# Patient Record
Sex: FEMALE | Race: WHITE | Employment: UNEMPLOYED | URBAN - METROPOLITAN AREA
[De-identification: names, ages, dates, MRNs, and addresses within clinical notes are randomized per-mention and may not be internally consistent; named-entity substitution may affect disease eponyms.]

---

## 2023-04-18 ENCOUNTER — INITIAL PRENATAL (OUTPATIENT)
Dept: OBGYN CLINIC | Age: 33
End: 2023-04-18
Payer: COMMERCIAL

## 2023-04-18 ENCOUNTER — ANCILLARY PROCEDURE (OUTPATIENT)
Dept: OBGYN CLINIC | Age: 33
End: 2023-04-18
Payer: COMMERCIAL

## 2023-04-18 DIAGNOSIS — Z34.90 PREGNANCY, UNSPECIFIED GESTATIONAL AGE: Primary | ICD-10-CM

## 2023-04-18 PROCEDURE — 76817 TRANSVAGINAL US OBSTETRIC: CPT | Performed by: OBSTETRICS & GYNECOLOGY

## 2023-04-18 PROCEDURE — 99203 OFFICE O/P NEW LOW 30 MIN: CPT

## 2023-04-18 NOTE — PROGRESS NOTES
MFM Note    The patient presented today for an ultrasound only for viability. A small subchorionic hemorrhage was seen. The patient is noted to be Rh positive. Pregnancy dating updated in [address] Please see the ultrasound report for details.

## 2023-05-08 ENCOUNTER — INITIAL PRENATAL (OUTPATIENT)
Dept: OBGYN | Age: 33
End: 2023-05-08
Payer: COMMERCIAL

## 2023-05-08 ENCOUNTER — HOSPITAL ENCOUNTER (OUTPATIENT)
Age: 33
Discharge: HOME | End: 2023-05-08
Payer: COMMERCIAL

## 2023-05-08 VITALS
DIASTOLIC BLOOD PRESSURE: 71 MMHG | HEIGHT: 64 IN | HEART RATE: 67 BPM | WEIGHT: 154.4 LBS | SYSTOLIC BLOOD PRESSURE: 110 MMHG | BODY MASS INDEX: 26.36 KG/M2

## 2023-05-08 DIAGNOSIS — Z12.4 CERVICAL CANCER SCREENING: ICD-10-CM

## 2023-05-08 DIAGNOSIS — Z34.91 ENCOUNTER FOR SUPERVISION OF NORMAL PREGNANCY IN FIRST TRIMESTER, UNSPECIFIED GRAVIDITY: Primary | ICD-10-CM

## 2023-05-08 DIAGNOSIS — Z34.91 ENCOUNTER FOR SUPERVISION OF NORMAL PREGNANCY IN FIRST TRIMESTER, UNSPECIFIED GRAVIDITY: ICD-10-CM

## 2023-05-08 DIAGNOSIS — N91.2 AMENORRHEA: ICD-10-CM

## 2023-05-08 DIAGNOSIS — Z98.890 H/O LEEP: ICD-10-CM

## 2023-05-08 PROBLEM — Z84.81 FAMILY HISTORY OF BREAST CANCER GENE MUTATION IN FIRST DEGREE RELATIVE: Status: ACTIVE | Noted: 2020-01-22

## 2023-05-08 LAB
ABO + RH BLD: NORMAL
AMPHET UR QL SCN: NOT DETECTED
BARBITURATES UR QL SCN: NOT DETECTED
BENZODIAZ UR QL SCN: NOT DETECTED
BLD GP AB SCN SERPL QL: NORMAL
CANNABINOIDS UR QL SCN: NOT DETECTED
COCAINE UR QL SCN: NOT DETECTED
DRUG SCREEN COMMENT UR-IMP: NORMAL
ERYTHROCYTE [DISTWIDTH] IN BLOOD BY AUTOMATED COUNT: 12.7 FL (ref 11.5–15)
FENTANYL SCREEN, URINE: NOT DETECTED
GLUCOSE URINE, POC: NEGATIVE
HCT VFR BLD AUTO: 39.5 % (ref 34–48)
HGB BLD-MCNC: 13 G/DL (ref 11.5–15.5)
MCH RBC QN AUTO: 29.1 PG (ref 26–35)
MCHC RBC AUTO-ENTMCNC: 32.9 % (ref 32–34.5)
MCV RBC AUTO: 88.4 FL (ref 80–99.9)
METHADONE UR QL SCN: NOT DETECTED
OPIATES UR QL SCN: NOT DETECTED
OXYCODONE URINE: NOT DETECTED
PCP UR QL SCN: NOT DETECTED
PLATELET # BLD AUTO: 252 E9/L (ref 130–450)
PMV BLD AUTO: 10.4 FL (ref 7–12)
PROTEIN UA: NEGATIVE
RBC # BLD AUTO: 4.47 E12/L (ref 3.5–5.5)
WBC # BLD: 7.9 E9/L (ref 4.5–11.5)

## 2023-05-08 PROCEDURE — 86850 RBC ANTIBODY SCREEN: CPT

## 2023-05-08 PROCEDURE — 81002 URINALYSIS NONAUTO W/O SCOPE: CPT | Performed by: MIDWIFE

## 2023-05-08 PROCEDURE — 86901 BLOOD TYPING SEROLOGIC RH(D): CPT

## 2023-05-08 PROCEDURE — 86762 RUBELLA ANTIBODY: CPT

## 2023-05-08 PROCEDURE — 80307 DRUG TEST PRSMV CHEM ANLYZR: CPT

## 2023-05-08 PROCEDURE — 83020 HEMOGLOBIN ELECTROPHORESIS: CPT

## 2023-05-08 PROCEDURE — 86703 HIV-1/HIV-2 1 RESULT ANTBDY: CPT

## 2023-05-08 PROCEDURE — 83021 HEMOGLOBIN CHROMOTOGRAPHY: CPT

## 2023-05-08 PROCEDURE — 86787 VARICELLA-ZOSTER ANTIBODY: CPT

## 2023-05-08 PROCEDURE — 85027 COMPLETE CBC AUTOMATED: CPT

## 2023-05-08 PROCEDURE — 86900 BLOOD TYPING SEROLOGIC ABO: CPT

## 2023-05-08 PROCEDURE — 86803 HEPATITIS C AB TEST: CPT

## 2023-05-08 PROCEDURE — 0502F SUBSEQUENT PRENATAL CARE: CPT | Performed by: MIDWIFE

## 2023-05-08 PROCEDURE — 87340 HEPATITIS B SURFACE AG IA: CPT

## 2023-05-08 PROCEDURE — 36415 COLL VENOUS BLD VENIPUNCTURE: CPT

## 2023-05-08 PROCEDURE — 86592 SYPHILIS TEST NON-TREP QUAL: CPT

## 2023-05-08 PROCEDURE — 87088 URINE BACTERIA CULTURE: CPT

## 2023-05-08 PROCEDURE — 99213 OFFICE O/P EST LOW 20 MIN: CPT

## 2023-05-08 RX ORDER — PSEUDOEPHEDRINE HCL 30 MG
200 TABLET ORAL NIGHTLY
COMMUNITY
Start: 2022-04-07 | End: 2023-05-08

## 2023-05-08 RX ORDER — ACETAMINOPHEN 325 MG/1
650 TABLET ORAL EVERY 6 HOURS PRN
COMMUNITY
Start: 2022-04-07

## 2023-05-08 SDOH — ECONOMIC STABILITY: HOUSING INSECURITY
IN THE LAST 12 MONTHS, WAS THERE A TIME WHEN YOU DID NOT HAVE A STEADY PLACE TO SLEEP OR SLEPT IN A SHELTER (INCLUDING NOW)?: NO

## 2023-05-08 SDOH — ECONOMIC STABILITY: INCOME INSECURITY: HOW HARD IS IT FOR YOU TO PAY FOR THE VERY BASICS LIKE FOOD, HOUSING, MEDICAL CARE, AND HEATING?: NOT HARD AT ALL

## 2023-05-08 SDOH — ECONOMIC STABILITY: FOOD INSECURITY: WITHIN THE PAST 12 MONTHS, YOU WORRIED THAT YOUR FOOD WOULD RUN OUT BEFORE YOU GOT MONEY TO BUY MORE.: NEVER TRUE

## 2023-05-08 SDOH — ECONOMIC STABILITY: FOOD INSECURITY: WITHIN THE PAST 12 MONTHS, THE FOOD YOU BOUGHT JUST DIDN'T LAST AND YOU DIDN'T HAVE MONEY TO GET MORE.: NEVER TRUE

## 2023-05-08 ASSESSMENT — ENCOUNTER SYMPTOMS
RESPIRATORY NEGATIVE: 1
ALLERGIC/IMMUNOLOGIC NEGATIVE: 1
EYES NEGATIVE: 1
GASTROINTESTINAL NEGATIVE: 1

## 2023-05-08 NOTE — PROGRESS NOTES
New patient alert and pleasant with no complaints. Previously seen at [facility] a few weeks ago. Was a former patient of Dr. Alexa Engel. Here today for initial prenatal visit. Urine for glucose and protein obtained with negative results. Pap smear and urines obtained, labeled and sent to the lab. Directed to the lab to obtain additional ordered blood work. Discharge instructions have been discussed with the patient. Patient advised to call our office with any questions or concerns. Voiced understanding.
Specific Question:   Specify (ex-cath, midstream, cysto, etc)? Answer:   midstream    PAP SMEAR     Patient History:    Patient's last menstrual period was 02/20/2023 (exact date). OBGYN Status: Pregnant  Past Surgical History:  No date: LEEP  1997: TONSILLECTOMY      Social History    Tobacco Use      Smoking status: Never      Smokeless tobacco: Never       Order Specific Question:   Collection Type     Answer:   Imaged Thin Prep     Order Specific Question:   Prior Abnormal Pap Test     Answer:   No     Order Specific Question:   Screening or Diagnostic     Answer:   Screening     Order Specific Question:   Additional STD Testing     Answer:   GC and Chlamydia     Order Specific Question:   Diagnosis Code for Additional STD Testing     Answer:   Screen for STD (sexually transmitted disease) (Z11.3)     Order Specific Question:   HPV Requested? Answer:   HPV Co-Test     Order Specific Question:   High Risk Patient     Answer:   N/A    CBC     Standing Status:   Future     Standing Expiration Date:   5/8/2024    Hepatitis B Surface Antigen     Standing Status:   Future     Standing Expiration Date:   5/8/2024    HIV Screen     Standing Status:   Future     Standing Expiration Date:   5/8/2024    RPR     Standing Status:   Future     Standing Expiration Date:   5/8/2024    Rubella     Standing Status:   Future     Standing Expiration Date:   5/8/2024    Miscellaneous Sendout     Standing Status:   Future     Standing Expiration Date:   5/8/2024     Order Specific Question:   Specify Req.  Test (1 Test/Order)     Answer:   hemoglobin electrophoresis    Varicella Zoster Antibody, IgG     Standing Status:   Future     Standing Expiration Date:   5/8/2024    Hepatitis C Antibody     Standing Status:   Future     Standing Expiration Date:   5/8/2024    URINE DRUG SCREEN     Standing Status:   Future     Standing Expiration Date:   5/8/2024    POCT urine qual dipstick glucose    POCT urine qual dipstick protein

## 2023-05-08 NOTE — PATIENT INSTRUCTIONS
OB Teaching    Call coverage/emergencies:  Patient was informed of call coverage including 24 hour coverage by house physician and rotating call from nurse midwives. Patient can call the clinic number for instructions on how to speak with the provider on call. Patient told to proceed to ER/L&D if heavy vaginal bleeding, LOF or other concerning sx. Course of pregnancy:  Patient informed that [id number] visits are monthly until 28 weeks, bi-weekly from 28-36 weeks, and weekly thereafter. Patient informed that labs are planned at new OB visit, at 25 -28 weeks gestation, and GBS at 36 weeks gestation; ultrasound planned in first trimester and anatomy scan at 20 weeks with M. Additional labs/imaging may be done if necessary, including genetic screening in first and second trimester. Discussed genetic screening. Nutrition:  Patient instructed to inform provider if vomiting and unable to keep anything down for 12 hours. Nausea can be treated with small frequent meals, preventing dehydration, Vitamin B6 and Unisom. These are very inexpensive, and are safe to take during pregnancy. Take them at night as the Unisom will make you tired. If these aren't helpful after 3-4 days of taking them every night, please let us know. Prescriptions can be added if these measures not helpful. Patient is to take prenatal vitamins and ensure that she has [id number] of folic acid daily throughout pregnancy and continue throughout childbearing years. Starting in second trimester, patient should have minimum of 70 grams of protein per day (4 servings). Avoid soft cheeses, meats that are not cooked through, lunch meat, and certain fish including shark, tilefish, robert [facility] and swordfish. Tylene Sully should be limited to once per week. Lifting:  patient should not lift more than 15 lbs using good body mechanics. Work note can be provided    Dentist:  Patient should continue routine dental care during pregnancy.   Should an issue

## 2023-05-09 LAB
AMPHET UR QL SCN: NOT DETECTED
BARBITURATES UR QL SCN: NOT DETECTED
BENZODIAZ UR QL SCN: NOT DETECTED
CANNABINOIDS UR QL SCN: NOT DETECTED
COCAINE UR QL SCN: NOT DETECTED
DRUG SCREEN COMMENT UR-IMP: NORMAL
FENTANYL SCREEN, URINE: NOT DETECTED
HBV SURFACE AG SERPL QL IA: NORMAL
HCV AB SERPL QL IA: NORMAL
HIV1+2 AB SERPL QL IA: NORMAL
METHADONE UR QL SCN: NOT DETECTED
OPIATES UR QL SCN: NOT DETECTED
OXYCODONE URINE: NOT DETECTED
PCP UR QL SCN: NOT DETECTED
RPR SER QL: NORMAL
RUBELLA ANTIBODY IGG: NORMAL
VARICELLA-ZOSTER VIRUS AB, IGG: NORMAL

## 2023-05-10 LAB — BACTERIA UR CULT: NORMAL

## 2023-05-11 LAB
BACTERIA UR CULT: NORMAL
C TRACH DNA SPEC QL NAA+PROBE: NEGATIVE
HPV HR 12 DNA SPEC QL NAA+PROBE: NOT DETECTED
HPV SAMPLE: NORMAL
HPV16 DNA SPEC QL NAA+PROBE: NOT DETECTED
HPV16+18+H RISK 12 DNA CVX-IMP: NORMAL
HPV18 DNA SPEC QL NAA+PROBE: NOT DETECTED
N GONORRHOEA DNA SPEC QL NAA+PROBE: NEGATIVE
SPECIMEN SOURCE: NORMAL
SPECIMEN SOURCE: NORMAL

## 2023-05-12 LAB
Lab: NORMAL
REPORT: NORMAL
THIS TEST SENT TO: NORMAL

## 2023-06-05 ENCOUNTER — ROUTINE PRENATAL (OUTPATIENT)
Dept: OBGYN | Age: 33
End: 2023-06-05
Payer: COMMERCIAL

## 2023-06-05 VITALS
HEART RATE: 66 BPM | WEIGHT: 158.1 LBS | SYSTOLIC BLOOD PRESSURE: 108 MMHG | DIASTOLIC BLOOD PRESSURE: 70 MMHG | BODY MASS INDEX: 27.14 KG/M2

## 2023-06-05 DIAGNOSIS — Z34.91 ENCOUNTER FOR SUPERVISION OF NORMAL PREGNANCY IN FIRST TRIMESTER, UNSPECIFIED GRAVIDITY: Primary | ICD-10-CM

## 2023-06-05 LAB
GLUCOSE URINE, POC: NEGATIVE
PROTEIN UA: NEGATIVE

## 2023-06-05 PROCEDURE — 99213 OFFICE O/P EST LOW 20 MIN: CPT | Performed by: MIDWIFE

## 2023-06-05 PROCEDURE — 81002 URINALYSIS NONAUTO W/O SCOPE: CPT | Performed by: MIDWIFE

## 2023-06-05 PROCEDURE — G8427 DOCREV CUR MEDS BY ELIG CLIN: HCPCS | Performed by: MIDWIFE

## 2023-06-05 PROCEDURE — 1036F TOBACCO NON-USER: CPT | Performed by: MIDWIFE

## 2023-06-05 PROCEDURE — G8419 CALC BMI OUT NRM PARAM NOF/U: HCPCS | Performed by: MIDWIFE

## 2023-07-10 ENCOUNTER — INITIAL PRENATAL (OUTPATIENT)
Dept: OBGYN CLINIC | Age: 33
End: 2023-07-10
Payer: COMMERCIAL

## 2023-07-10 ENCOUNTER — ROUTINE PRENATAL (OUTPATIENT)
Dept: OBGYN | Age: 33
End: 2023-07-10
Payer: COMMERCIAL

## 2023-07-10 ENCOUNTER — ANCILLARY PROCEDURE (OUTPATIENT)
Dept: OBGYN CLINIC | Age: 33
End: 2023-07-10
Payer: COMMERCIAL

## 2023-07-10 VITALS
SYSTOLIC BLOOD PRESSURE: 102 MMHG | HEIGHT: 64 IN | HEART RATE: 68 BPM | DIASTOLIC BLOOD PRESSURE: 62 MMHG | BODY MASS INDEX: 28.51 KG/M2 | WEIGHT: 167 LBS

## 2023-07-10 VITALS
DIASTOLIC BLOOD PRESSURE: 63 MMHG | BODY MASS INDEX: 28.9 KG/M2 | HEART RATE: 91 BPM | SYSTOLIC BLOOD PRESSURE: 100 MMHG | WEIGHT: 168.38 LBS

## 2023-07-10 DIAGNOSIS — O43.192 MARGINAL INSERTION OF UMBILICAL CORD AFFECTING MANAGEMENT OF MOTHER IN SECOND TRIMESTER: ICD-10-CM

## 2023-07-10 DIAGNOSIS — Z71.83 ENCOUNTER FOR NONPROCREATIVE GENETIC COUNSELING: ICD-10-CM

## 2023-07-10 DIAGNOSIS — Z98.890 H/O LEEP: ICD-10-CM

## 2023-07-10 DIAGNOSIS — Z3A.20 20 WEEKS GESTATION OF PREGNANCY: Primary | ICD-10-CM

## 2023-07-10 DIAGNOSIS — Z98.890 H/O LEEP: Primary | ICD-10-CM

## 2023-07-10 DIAGNOSIS — Z80.3 FAMILY HISTORY OF BREAST CANCER: ICD-10-CM

## 2023-07-10 LAB
GLUCOSE URINE, POC: NEGATIVE
PROTEIN UA: NEGATIVE

## 2023-07-10 PROCEDURE — G8427 DOCREV CUR MEDS BY ELIG CLIN: HCPCS | Performed by: OBSTETRICS & GYNECOLOGY

## 2023-07-10 PROCEDURE — 0502F SUBSEQUENT PRENATAL CARE: CPT | Performed by: MIDWIFE

## 2023-07-10 PROCEDURE — 99203 OFFICE O/P NEW LOW 30 MIN: CPT | Performed by: OBSTETRICS & GYNECOLOGY

## 2023-07-10 PROCEDURE — 81002 URINALYSIS NONAUTO W/O SCOPE: CPT | Performed by: MIDWIFE

## 2023-07-10 PROCEDURE — G8419 CALC BMI OUT NRM PARAM NOF/U: HCPCS | Performed by: OBSTETRICS & GYNECOLOGY

## 2023-07-10 PROCEDURE — 76811 OB US DETAILED SNGL FETUS: CPT | Performed by: OBSTETRICS & GYNECOLOGY

## 2023-07-10 PROCEDURE — 99213 OFFICE O/P EST LOW 20 MIN: CPT | Performed by: MIDWIFE

## 2023-07-10 PROCEDURE — 1036F TOBACCO NON-USER: CPT | Performed by: MIDWIFE

## 2023-07-10 PROCEDURE — 76817 TRANSVAGINAL US OBSTETRIC: CPT | Performed by: OBSTETRICS & GYNECOLOGY

## 2023-07-10 NOTE — PROGRESS NOTES
, return OB at 20w0d weeks    Patient Active Problem List   Diagnosis    Family history of breast cancer gene mutation in first degree relative    H/O LEEP        Subjective:  doing well. She is concerned about her weigh gain, which is 13# thus far. We reviewed the guidelines, she was relieved that she is doing well with that    Nausea no Vomiting no   Bleeding no     Objective:  See flowsheet  Vitals:    07/10/23 1146   BP: 102/62   Pulse: 68     US today per MFM  She has marginal insertion, needs repeat for additional heart views.   Reports was not finalized so I didn't personally review it    Assessment:   at 20w0d   Blood pressure WNL  Size equals dates  Total weight gain appropriate  Not eligible    ICD-10-CM    1. H/O LEEP  Z98.890 POCT urine qual dipstick protein     POCT urine qual dipstick glucose           Plan:    RTO 4 weeks  Orders Placed This Encounter   Procedures    POCT urine qual dipstick protein    POCT urine qual dipstick glucose     , return OB at 20w0d weeks

## 2023-07-10 NOTE — PROGRESS NOTES
Routine ob  +fm  Pt denies lof vag bleeding or contractions  Pt denies any other concerns  Urine dip neg/neg ORIF left humerus

## 2023-07-17 PROBLEM — Z80.3 FAMILY HISTORY OF BREAST CANCER: Status: ACTIVE | Noted: 2023-07-17

## 2023-07-17 PROBLEM — Z71.83 ENCOUNTER FOR NONPROCREATIVE GENETIC COUNSELING: Status: ACTIVE | Noted: 2023-07-17

## 2023-07-17 PROBLEM — O43.192 MARGINAL INSERTION OF UMBILICAL CORD AFFECTING MANAGEMENT OF MOTHER IN SECOND TRIMESTER: Status: ACTIVE | Noted: 2023-07-17

## 2023-07-28 ENCOUNTER — HOSPITAL ENCOUNTER (OUTPATIENT)
Age: 33
Discharge: HOME | End: 2023-07-28
Payer: COMMERCIAL

## 2023-07-28 DIAGNOSIS — Z34.91 ENCOUNTER FOR SUPERVISION OF NORMAL PREGNANCY IN FIRST TRIMESTER, UNSPECIFIED GRAVIDITY: ICD-10-CM

## 2023-07-28 PROCEDURE — 82105 ALPHA-FETOPROTEIN SERUM: CPT

## 2023-07-31 LAB
AFP INTERPRETATION: NORMAL
AFP MOM: 0.85
AFP SPECIMEN: NORMAL
AFP: 71 NG/ML
DATE OF BIRTH: NORMAL
DATING METHOD: NORMAL
DETERMINED BY: NORMAL
DIABETIC: NORMAL
DONOR EGG?: NORMAL
DUE DATE: NORMAL
ESTIMATED DUE DATE: NORMAL
FAMILY HISTORY NTD: NORMAL
GESTATIONAL AGE: NORMAL
IN VITRO FERTILIZATION: NORMAL
INSULIN REQ DIABETES: NO
LAST MENSTRUAL PERIOD: NORMAL
MATERNAL AGE AT EDD: 32.5 YR
MATERNAL WEIGHT: NORMAL
MONOCHORIONIC TWINS: NORMAL
NUMBER OF FETUSES: NORMAL
PATIENT WEIGHT UNITS: NORMAL
PATIENT WEIGHT: NORMAL
RACE (MATERNAL): NORMAL
RACE: NORMAL
REPEAT SPECIMEN?: NORMAL
SMOKING: NO
SMOKING: NORMAL
VALPROIC/CARBAMAZEP: NORMAL
ZZ NTE CLEAN UP: HISTORY: NO

## 2023-08-07 ENCOUNTER — ANCILLARY PROCEDURE (OUTPATIENT)
Dept: OBGYN CLINIC | Age: 33
End: 2023-08-07
Payer: COMMERCIAL

## 2023-08-07 ENCOUNTER — ROUTINE PRENATAL (OUTPATIENT)
Dept: OBGYN | Age: 33
End: 2023-08-07
Payer: COMMERCIAL

## 2023-08-07 ENCOUNTER — ROUTINE PRENATAL (OUTPATIENT)
Dept: OBGYN CLINIC | Age: 33
End: 2023-08-07
Payer: COMMERCIAL

## 2023-08-07 VITALS
SYSTOLIC BLOOD PRESSURE: 106 MMHG | HEART RATE: 69 BPM | BODY MASS INDEX: 29.99 KG/M2 | DIASTOLIC BLOOD PRESSURE: 72 MMHG | WEIGHT: 174.7 LBS

## 2023-08-07 VITALS
DIASTOLIC BLOOD PRESSURE: 71 MMHG | WEIGHT: 175.38 LBS | HEART RATE: 105 BPM | SYSTOLIC BLOOD PRESSURE: 103 MMHG | BODY MASS INDEX: 30.1 KG/M2

## 2023-08-07 DIAGNOSIS — Z98.890 H/O LEEP: Primary | ICD-10-CM

## 2023-08-07 DIAGNOSIS — Z3A.24 24 WEEKS GESTATION OF PREGNANCY: ICD-10-CM

## 2023-08-07 DIAGNOSIS — Z3A.24 PREGNANCY WITH 24 COMPLETED WEEKS GESTATION: ICD-10-CM

## 2023-08-07 DIAGNOSIS — O43.192 MARGINAL INSERTION OF UMBILICAL CORD AFFECTING MANAGEMENT OF MOTHER IN SECOND TRIMESTER: ICD-10-CM

## 2023-08-07 DIAGNOSIS — Z71.83 ENCOUNTER FOR NONPROCREATIVE GENETIC COUNSELING: ICD-10-CM

## 2023-08-07 DIAGNOSIS — Z80.3 FAMILY HISTORY OF BREAST CANCER: ICD-10-CM

## 2023-08-07 LAB
GLUCOSE URINE, POC: NEGATIVE
PROTEIN UA: POSITIVE

## 2023-08-07 PROCEDURE — 81002 URINALYSIS NONAUTO W/O SCOPE: CPT | Performed by: OBSTETRICS & GYNECOLOGY

## 2023-08-07 PROCEDURE — G8427 DOCREV CUR MEDS BY ELIG CLIN: HCPCS | Performed by: MIDWIFE

## 2023-08-07 PROCEDURE — 99213 OFFICE O/P EST LOW 20 MIN: CPT | Performed by: MIDWIFE

## 2023-08-07 PROCEDURE — G8427 DOCREV CUR MEDS BY ELIG CLIN: HCPCS | Performed by: OBSTETRICS & GYNECOLOGY

## 2023-08-07 PROCEDURE — 76817 TRANSVAGINAL US OBSTETRIC: CPT | Performed by: OBSTETRICS & GYNECOLOGY

## 2023-08-07 PROCEDURE — 76816 OB US FOLLOW-UP PER FETUS: CPT | Performed by: OBSTETRICS & GYNECOLOGY

## 2023-08-07 PROCEDURE — 1036F TOBACCO NON-USER: CPT | Performed by: MIDWIFE

## 2023-08-07 PROCEDURE — G8419 CALC BMI OUT NRM PARAM NOF/U: HCPCS | Performed by: MIDWIFE

## 2023-08-07 PROCEDURE — 99213 OFFICE O/P EST LOW 20 MIN: CPT | Performed by: OBSTETRICS & GYNECOLOGY

## 2023-08-07 PROCEDURE — G8419 CALC BMI OUT NRM PARAM NOF/U: HCPCS | Performed by: OBSTETRICS & GYNECOLOGY

## 2023-08-07 PROCEDURE — 99214 OFFICE O/P EST MOD 30 MIN: CPT | Performed by: OBSTETRICS & GYNECOLOGY

## 2023-08-07 PROCEDURE — 1036F TOBACCO NON-USER: CPT | Performed by: OBSTETRICS & GYNECOLOGY

## 2023-08-07 ASSESSMENT — PATIENT HEALTH QUESTIONNAIRE - PHQ9
SUM OF ALL RESPONSES TO PHQ QUESTIONS 1-9: 0
SUM OF ALL RESPONSES TO PHQ9 QUESTIONS 1 & 2: 0
SUM OF ALL RESPONSES TO PHQ QUESTIONS 1-9: 0
1. LITTLE INTEREST OR PLEASURE IN DOING THINGS: 0
2. FEELING DOWN, DEPRESSED OR HOPELESS: 0

## 2023-08-07 NOTE — PROGRESS NOTES
Patient alert and pleasant with no complaints. Here today for prenatal visit. Fetal heart tones and urine already done at [facility] this am.  Directed to the lab to obtain ordered blood work. Discharge instructions have been discussed with the patient. Patient advised to call our office with any questions or concerns. Voiced understanding.

## 2023-08-07 NOTE — PATIENT INSTRUCTIONS
Please arrive for your scheduled appointment at least 15 minutes early with your actual insurance card+ a photo ID. Also if you need any refills ordered or have questions, it may take up 48 hours to reply. Please allow ample time for your refills. Call me when you use last refill. Thank you for your cooperation. Call your primary obstetrician with bleeding, leaking of fluid, abdominal tenderness, headache, blurry vision, epigastric pain and increased urinary frequency. If you are experiencing an emergency and need immediate help, call 911 or go to go emergency room or labor and delivery. if you are sick, not feeling well or have an infectious process going on please reschedule your appointment by calling [phone]. Also if any family members are not feeling well, please do not bring them to your appointment. We appreciate your cooperation. We are doing this in order to protect our pregnant mothers+ their babies. if you are sick, not feeling well or have an infectious process going on please reschedule your appointment by calling [phone]. Also if any family members are not feeling well, please do not bring them to your appointment. We appreciate your cooperation. We are doing this in order to protect our pregnant mothers+ their babies.

## 2023-08-07 NOTE — PROGRESS NOTES
Dr. Felecia Hull adding quad screen to AFP specimen. Patient is OK with that, but she doesn't want Panorama. She had US today at [facility] to recheck marginal insertion. Notes/US not available for review     , return OB at 24w0d weeks    Patient Active Problem List   Diagnosis    Family history of breast cancer gene mutation in first degree relative    H/O LEEP    Encounter for nonprocreative genetic counseling    Marginal insertion of umbilical cord affecting management of mother in second trimester    Family history of breast cancer        Subjective:  doing well    Bleeding no   Leaking of fluid no   Painful cramping/contractions no   Headache no   Epigastric pain no   Edema no     Fetal movement +, she is feeling movement every day now    Objective:  See flowsheet    Vitals:    23 1134   BP: 106/72   Pulse: 69       FH per US  FHT per US    1 hour GTT and CBC ordered today, she will get this done day before next visit  TDAP:  discussed, she is agreeable, will get at next visit    Assessment:   at 24w0d   Blood pressure WNL    Total weight gain appropriate   rhogam:  Not eligible    ICD-10-CM    1. H/O LEEP  Z98.890       2. Pregnancy with 24 completed weeks gestation  Z3A.24 CBC     Type and Screen     RPR     Glucose tolerance, 1 hour           Plan:    RTO 4 weeks    Orders Placed This Encounter   Procedures    CBC     Standing Status:   Future     Standing Expiration Date:   10/7/2023    RPR     Standing Status:   Future     Standing Expiration Date:   10/7/2023    Glucose tolerance, 1 hour     Standing Status:   Future     Standing Expiration Date:   10/7/2023    Type and Screen     Standing Status:   Future     Standing Expiration Date:   2024      Fetal movement:  Baby should move 10 times every 2 hours. If movements decrease below 10 in 2 hours, or decrease from what is typical for that pregnancy, patient should eat something, drink something, and lay down to count movements.

## 2023-09-06 ENCOUNTER — ROUTINE PRENATAL (OUTPATIENT)
Dept: OBGYN CLINIC | Age: 33
End: 2023-09-06
Payer: COMMERCIAL

## 2023-09-06 ENCOUNTER — ANCILLARY PROCEDURE (OUTPATIENT)
Dept: OBGYN CLINIC | Age: 33
End: 2023-09-06
Payer: COMMERCIAL

## 2023-09-06 VITALS
DIASTOLIC BLOOD PRESSURE: 65 MMHG | HEART RATE: 66 BPM | WEIGHT: 180 LBS | BODY MASS INDEX: 30.9 KG/M2 | SYSTOLIC BLOOD PRESSURE: 93 MMHG

## 2023-09-06 DIAGNOSIS — Z98.890 H/O LEEP: Primary | ICD-10-CM

## 2023-09-06 DIAGNOSIS — O43.192 MARGINAL INSERTION OF UMBILICAL CORD AFFECTING MANAGEMENT OF MOTHER IN SECOND TRIMESTER: ICD-10-CM

## 2023-09-06 DIAGNOSIS — Z3A.28 28 WEEKS GESTATION OF PREGNANCY: ICD-10-CM

## 2023-09-06 LAB
GLUCOSE URINE, POC: NEGATIVE
PROTEIN UA: NEGATIVE

## 2023-09-06 PROCEDURE — 76821 MIDDLE CEREBRAL ARTERY ECHO: CPT | Performed by: OBSTETRICS & GYNECOLOGY

## 2023-09-06 PROCEDURE — 99213 OFFICE O/P EST LOW 20 MIN: CPT | Performed by: OBSTETRICS & GYNECOLOGY

## 2023-09-06 PROCEDURE — 81002 URINALYSIS NONAUTO W/O SCOPE: CPT | Performed by: OBSTETRICS & GYNECOLOGY

## 2023-09-06 PROCEDURE — 76820 UMBILICAL ARTERY ECHO: CPT | Performed by: OBSTETRICS & GYNECOLOGY

## 2023-09-06 PROCEDURE — 76817 TRANSVAGINAL US OBSTETRIC: CPT | Performed by: OBSTETRICS & GYNECOLOGY

## 2023-09-06 PROCEDURE — G8427 DOCREV CUR MEDS BY ELIG CLIN: HCPCS | Performed by: OBSTETRICS & GYNECOLOGY

## 2023-09-06 PROCEDURE — 76819 FETAL BIOPHYS PROFIL W/O NST: CPT | Performed by: OBSTETRICS & GYNECOLOGY

## 2023-09-06 PROCEDURE — 76816 OB US FOLLOW-UP PER FETUS: CPT | Performed by: OBSTETRICS & GYNECOLOGY

## 2023-09-06 PROCEDURE — 1036F TOBACCO NON-USER: CPT | Performed by: OBSTETRICS & GYNECOLOGY

## 2023-09-06 PROCEDURE — G8419 CALC BMI OUT NRM PARAM NOF/U: HCPCS | Performed by: OBSTETRICS & GYNECOLOGY

## 2023-09-06 NOTE — PATIENT INSTRUCTIONS

## 2023-09-08 ENCOUNTER — HOSPITAL ENCOUNTER (OUTPATIENT)
Age: 33
Discharge: HOME | End: 2023-09-08
Payer: COMMERCIAL

## 2023-09-08 DIAGNOSIS — Z3A.24 PREGNANCY WITH 24 COMPLETED WEEKS GESTATION: ICD-10-CM

## 2023-09-08 LAB
ABO + RH BLD: NORMAL
AMOUNT GLUCOSE GIVEN: 50 G
BLOOD BANK SAMPLE EXPIRATION: NORMAL
BLOOD GROUP ANTIBODIES SERPL: NEGATIVE
COLLECT TIME, 1HR GLUCOSE: NORMAL
ERYTHROCYTE [DISTWIDTH] IN BLOOD BY AUTOMATED COUNT: 12.8 % (ref 11.5–15)
GLUCOSE 3H P 100 G GLC PO SERPL-MCNC: 68 MG/DL
HCT VFR BLD AUTO: 35.3 % (ref 34–48)
HGB BLD-MCNC: 11.8 G/DL (ref 11.5–15.5)
MCH RBC QN AUTO: 30.1 PG (ref 26–35)
MCHC RBC AUTO-ENTMCNC: 33.4 G/DL (ref 32–34.5)
MCV RBC AUTO: 90.1 FL (ref 80–99.9)
PLATELET # BLD AUTO: 185 K/UL (ref 130–450)
PMV BLD AUTO: 10.8 FL (ref 7–12)
RBC # BLD AUTO: 3.92 M/UL (ref 3.5–5.5)
WBC OTHER # BLD: 7.8 K/UL (ref 4.5–11.5)

## 2023-09-08 PROCEDURE — 86850 RBC ANTIBODY SCREEN: CPT

## 2023-09-08 PROCEDURE — 86900 BLOOD TYPING SEROLOGIC ABO: CPT

## 2023-09-08 PROCEDURE — 85027 COMPLETE CBC AUTOMATED: CPT

## 2023-09-08 PROCEDURE — 82947 ASSAY GLUCOSE BLOOD QUANT: CPT

## 2023-09-08 PROCEDURE — 36415 COLL VENOUS BLD VENIPUNCTURE: CPT

## 2023-09-08 PROCEDURE — 86901 BLOOD TYPING SEROLOGIC RH(D): CPT

## 2023-09-08 PROCEDURE — 86592 SYPHILIS TEST NON-TREP QUAL: CPT

## 2023-09-11 ENCOUNTER — ROUTINE PRENATAL (OUTPATIENT)
Dept: OBGYN | Age: 33
End: 2023-09-11
Payer: COMMERCIAL

## 2023-09-11 VITALS
BODY MASS INDEX: 31.21 KG/M2 | SYSTOLIC BLOOD PRESSURE: 102 MMHG | HEART RATE: 74 BPM | DIASTOLIC BLOOD PRESSURE: 68 MMHG | WEIGHT: 181.8 LBS

## 2023-09-11 DIAGNOSIS — Z34.93 ENCOUNTER FOR SUPERVISION OF NORMAL PREGNANCY IN THIRD TRIMESTER, UNSPECIFIED GRAVIDITY: Primary | ICD-10-CM

## 2023-09-11 LAB
GLUCOSE URINE, POC: NEGATIVE
PROTEIN UA: NEGATIVE
RPR SER QL: NONREACTIVE

## 2023-09-11 PROCEDURE — 99213 OFFICE O/P EST LOW 20 MIN: CPT | Performed by: MIDWIFE

## 2023-09-11 PROCEDURE — 90715 TDAP VACCINE 7 YRS/> IM: CPT | Performed by: MIDWIFE

## 2023-09-11 PROCEDURE — 81002 URINALYSIS NONAUTO W/O SCOPE: CPT | Performed by: MIDWIFE

## 2023-09-11 PROCEDURE — 0502F SUBSEQUENT PRENATAL CARE: CPT | Performed by: MIDWIFE

## 2023-09-11 NOTE — PROGRESS NOTES
Patient alert and pleasant with no complaints. Here today for prenatal visit. Fetal heart tones obtained without difficulty. Urine for glucose and protein obtained with negative results. TDAP given left arm. Tolerated well. Discharge instructions have been discussed with the patient. Patient advised to call our office with any questions or concerns. Voiced understanding.

## 2023-09-11 NOTE — PROGRESS NOTES
, return OB at 29w0d weeks    Patient Active Problem List   Diagnosis    Family history of breast cancer gene mutation in first degree relative    H/O LEEP    Encounter for nonprocreative genetic counseling    Marginal insertion of umbilical cord affecting management of mother in second trimester    Family history of breast cancer        Subjective:  doing well    Bleeding no   Leaking of fluid no   Painful cramping/contractions no   Headache no   Epigastric pain no   Edema no     Fetal movement good, patient reports 10 movements in 2 hours    Objective:  See flowsheet    Vitals:    23 1145   BP: 102/68   Pulse: 74     Labs reviewed, all WNL    FH 29  FHT per flowsheet    Tdap Vaccine discussed and ordered, she left before she could get it, will give next visit      Assessment:   at 29w0d   Blood pressure WNL  Size equals dates  Total weight gain appropriate   rhogam:  Not eligible    ICD-10-CM    1. Encounter for supervision of normal pregnancy in third trimester, unspecified   Z34.93 POCT urine qual dipstick glucose     POCT urine qual dipstick protein           Plan:    RTO 2 weeks    Orders Placed This Encounter   Procedures    POCT urine qual dipstick glucose    POCT urine qual dipstick protein      Fetal movement:  Baby should move 10 times every 2 hours. If movements decrease below 10 in 2 hours, or decrease from what is typical for that pregnancy, patient should eat something, drink something, and lay down to count movements. If she does not feel 10 movements after doing these things, she is to immediately proceed to L&D for NST. She should NOT WAIT until the next day.  labor precautions discussed with patient. Patient should report >4 contractions/tightenings in 1 hour after first emptying bladder, laying down, drinking 2 large glasses of water. Should also promptly report any vaginal bleeding, menstrual-type cramping, dysuria, urgency or frequency.

## 2023-09-25 ENCOUNTER — ROUTINE PRENATAL (OUTPATIENT)
Dept: OBGYN | Age: 33
End: 2023-09-25
Payer: COMMERCIAL

## 2023-09-25 VITALS
DIASTOLIC BLOOD PRESSURE: 74 MMHG | WEIGHT: 185.9 LBS | BODY MASS INDEX: 31.91 KG/M2 | SYSTOLIC BLOOD PRESSURE: 113 MMHG | HEART RATE: 91 BPM

## 2023-09-25 DIAGNOSIS — Z34.93 ENCOUNTER FOR SUPERVISION OF NORMAL PREGNANCY IN THIRD TRIMESTER, UNSPECIFIED GRAVIDITY: Primary | ICD-10-CM

## 2023-09-25 LAB
GLUCOSE URINE, POC: NEGATIVE
PROTEIN UA: NEGATIVE

## 2023-09-25 PROCEDURE — 1036F TOBACCO NON-USER: CPT | Performed by: MIDWIFE

## 2023-09-25 PROCEDURE — 81002 URINALYSIS NONAUTO W/O SCOPE: CPT | Performed by: MIDWIFE

## 2023-09-25 PROCEDURE — 99213 OFFICE O/P EST LOW 20 MIN: CPT | Performed by: MIDWIFE

## 2023-09-25 PROCEDURE — G8419 CALC BMI OUT NRM PARAM NOF/U: HCPCS | Performed by: MIDWIFE

## 2023-09-25 PROCEDURE — G8427 DOCREV CUR MEDS BY ELIG CLIN: HCPCS | Performed by: MIDWIFE

## 2023-09-25 PROCEDURE — 0502F SUBSEQUENT PRENATAL CARE: CPT | Performed by: MIDWIFE

## 2023-09-25 NOTE — PROGRESS NOTES
, return OB at 31w0d weeks    Patient Active Problem List   Diagnosis    Family history of breast cancer gene mutation in first degree relative    H/O LEEP    Encounter for nonprocreative genetic counseling    Marginal insertion of umbilical cord affecting management of mother in second trimester    Family history of breast cancer        Subjective:  doing well    Bleeding no   Leaking of fluid no   Painful cramping/contractions no   Headache no   Epigastric pain no   Edema no     Fetal movement good, patient reports 10 movements in 2 hours    Objective:  See flowsheet    Vitals:    23 1148   BP: 113/74   Pulse: 91       FH per flowsheet  FHT per flowsheet    B positive  1 hour GTT 68  RPR NR  Hgb 11.8/35.3, platelets 338  Received TDAP on 23  Last US 23, ai breech, cervix 42mm      Assessment:   at 31w0d   Blood pressure WNL  Size equals dates  Total weight gain appropriate   rhogam:  Not eligible    ICD-10-CM    1. Encounter for supervision of normal pregnancy in third trimester, unspecified   Z34.93 POCT urine qual dipstick glucose     POCT urine qual dipstick protein           Plan:    RTO 2 weeks    Orders Placed This Encounter   Procedures    POCT urine qual dipstick glucose    POCT urine qual dipstick protein      Fetal movement:  Baby should move 10 times every 2 hours. If movements decrease below 10 in 2 hours, or decrease from what is typical for that pregnancy, patient should eat something, drink something, and lay down to count movements. If she does not feel 10 movements after doing these things, she is to immediately proceed to L&D for NST. She should NOT WAIT until the next day.  labor precautions discussed with patient. Patient should report >4 contractions/tightenings in 1 hour after first emptying bladder, laying down, drinking 2 large glasses of water.   Should also promptly report any vaginal bleeding, menstrual-type cramping,

## 2023-10-04 ENCOUNTER — ROUTINE PRENATAL (OUTPATIENT)
Dept: OBGYN CLINIC | Age: 33
End: 2023-10-04
Payer: COMMERCIAL

## 2023-10-04 ENCOUNTER — ANCILLARY PROCEDURE (OUTPATIENT)
Dept: OBGYN CLINIC | Age: 33
End: 2023-10-04
Payer: COMMERCIAL

## 2023-10-04 VITALS
HEART RATE: 92 BPM | WEIGHT: 186 LBS | DIASTOLIC BLOOD PRESSURE: 74 MMHG | BODY MASS INDEX: 31.93 KG/M2 | SYSTOLIC BLOOD PRESSURE: 116 MMHG

## 2023-10-04 DIAGNOSIS — O43.192 MARGINAL INSERTION OF UMBILICAL CORD AFFECTING MANAGEMENT OF MOTHER IN SECOND TRIMESTER: ICD-10-CM

## 2023-10-04 DIAGNOSIS — O36.63X0 EXCESSIVE FETAL GROWTH AFFECTING MANAGEMENT OF PREGNANCY IN THIRD TRIMESTER, SINGLE OR UNSPECIFIED FETUS: ICD-10-CM

## 2023-10-04 DIAGNOSIS — R94.8 ABNORMAL PLACENTA FUNCTION TEST: ICD-10-CM

## 2023-10-04 DIAGNOSIS — R80.9 URINE PROTEIN INCREASED: ICD-10-CM

## 2023-10-04 DIAGNOSIS — Z3A.32 32 WEEKS GESTATION OF PREGNANCY: ICD-10-CM

## 2023-10-04 DIAGNOSIS — Z98.890 H/O LEEP: Primary | ICD-10-CM

## 2023-10-04 LAB
GLUCOSE URINE, POC: NEGATIVE
PROTEIN UA: NEGATIVE

## 2023-10-04 PROCEDURE — 76818 FETAL BIOPHYS PROFILE W/NST: CPT | Performed by: OBSTETRICS & GYNECOLOGY

## 2023-10-04 PROCEDURE — G8419 CALC BMI OUT NRM PARAM NOF/U: HCPCS | Performed by: OBSTETRICS & GYNECOLOGY

## 2023-10-04 PROCEDURE — 76817 TRANSVAGINAL US OBSTETRIC: CPT | Performed by: OBSTETRICS & GYNECOLOGY

## 2023-10-04 PROCEDURE — 99213 OFFICE O/P EST LOW 20 MIN: CPT | Performed by: OBSTETRICS & GYNECOLOGY

## 2023-10-04 PROCEDURE — 1036F TOBACCO NON-USER: CPT | Performed by: OBSTETRICS & GYNECOLOGY

## 2023-10-04 PROCEDURE — 76820 UMBILICAL ARTERY ECHO: CPT | Performed by: OBSTETRICS & GYNECOLOGY

## 2023-10-04 PROCEDURE — G8484 FLU IMMUNIZE NO ADMIN: HCPCS | Performed by: OBSTETRICS & GYNECOLOGY

## 2023-10-04 PROCEDURE — 81002 URINALYSIS NONAUTO W/O SCOPE: CPT | Performed by: OBSTETRICS & GYNECOLOGY

## 2023-10-04 PROCEDURE — 76816 OB US FOLLOW-UP PER FETUS: CPT | Performed by: OBSTETRICS & GYNECOLOGY

## 2023-10-04 PROCEDURE — 99214 OFFICE O/P EST MOD 30 MIN: CPT | Performed by: OBSTETRICS & GYNECOLOGY

## 2023-10-04 PROCEDURE — 76821 MIDDLE CEREBRAL ARTERY ECHO: CPT | Performed by: OBSTETRICS & GYNECOLOGY

## 2023-10-04 PROCEDURE — G8427 DOCREV CUR MEDS BY ELIG CLIN: HCPCS | Performed by: OBSTETRICS & GYNECOLOGY

## 2023-10-04 NOTE — PATIENT INSTRUCTIONS

## 2023-10-05 ENCOUNTER — HOSPITAL ENCOUNTER (OUTPATIENT)
Age: 33
Discharge: HOME | End: 2023-10-05
Payer: COMMERCIAL

## 2023-10-05 DIAGNOSIS — Z3A.32 32 WEEKS GESTATION OF PREGNANCY: ICD-10-CM

## 2023-10-05 DIAGNOSIS — R80.9 URINE PROTEIN INCREASED: ICD-10-CM

## 2023-10-05 DIAGNOSIS — O36.63X0 EXCESSIVE FETAL GROWTH AFFECTING MANAGEMENT OF PREGNANCY IN THIRD TRIMESTER, SINGLE OR UNSPECIFIED FETUS: ICD-10-CM

## 2023-10-05 DIAGNOSIS — R94.8 ABNORMAL PLACENTA FUNCTION TEST: ICD-10-CM

## 2023-10-05 LAB
25(OH)D3 SERPL-MCNC: 49.9 NG/ML (ref 30–100)
ALBUMIN SERPL-MCNC: 3.5 G/DL (ref 3.5–5.2)
ALP SERPL-CCNC: 70 U/L (ref 35–104)
ALT SERPL-CCNC: 19 U/L (ref 0–32)
ANION GAP SERPL CALCULATED.3IONS-SCNC: 10 MMOL/L (ref 7–16)
AST SERPL-CCNC: 18 U/L (ref 0–31)
BASOPHILS # BLD: 0.02 K/UL (ref 0–0.2)
BASOPHILS NFR BLD: 0 % (ref 0–2)
BILIRUB SERPL-MCNC: 0.2 MG/DL (ref 0–1.2)
BILIRUB UR QL STRIP: NEGATIVE
BUN SERPL-MCNC: 8 MG/DL (ref 6–20)
CALCIUM SERPL-MCNC: 8.5 MG/DL (ref 8.6–10.2)
CHLORIDE SERPL-SCNC: 104 MMOL/L (ref 98–107)
CLARITY UR: CLEAR
CO2 SERPL-SCNC: 21 MMOL/L (ref 22–29)
COLOR UR: YELLOW
CREAT SERPL-MCNC: 0.4 MG/DL (ref 0.5–1)
CREAT UR-MCNC: 71.2 MG/DL (ref 29–226)
EOSINOPHIL # BLD: 0.13 K/UL (ref 0.05–0.5)
EOSINOPHILS RELATIVE PERCENT: 1 % (ref 0–6)
ERYTHROCYTE [DISTWIDTH] IN BLOOD BY AUTOMATED COUNT: 13 % (ref 11.5–15)
FERRITIN SERPL-MCNC: 16 NG/ML
FOLATE SERPL-MCNC: >20 NG/ML (ref 4.8–24.2)
GFR SERPL CREATININE-BSD FRML MDRD: >60 ML/MIN/1.73M2
GLUCOSE SERPL-MCNC: 115 MG/DL (ref 74–99)
GLUCOSE UR STRIP-MCNC: NEGATIVE MG/DL
HBA1C MFR BLD: 4.7 % (ref 4–5.6)
HCT VFR BLD AUTO: 36.3 % (ref 34–48)
HGB BLD-MCNC: 12 G/DL (ref 11.5–15.5)
HGB UR QL STRIP.AUTO: NEGATIVE
IMM GRANULOCYTES # BLD AUTO: 0.14 K/UL (ref 0–0.58)
IMM GRANULOCYTES NFR BLD: 2 % (ref 0–5)
KETONES UR STRIP-MCNC: NEGATIVE MG/DL
LDH SERPL-CCNC: 220 U/L (ref 135–214)
LEUKOCYTE ESTERASE UR QL STRIP: ABNORMAL
LYMPHOCYTES NFR BLD: 2.01 K/UL (ref 1.5–4)
LYMPHOCYTES RELATIVE PERCENT: 21 % (ref 20–42)
MAGNESIUM SERPL-MCNC: 1.7 MG/DL (ref 1.6–2.6)
MCH RBC QN AUTO: 29.8 PG (ref 26–35)
MCHC RBC AUTO-ENTMCNC: 33.1 G/DL (ref 32–34.5)
MCV RBC AUTO: 90.1 FL (ref 80–99.9)
MONOCYTES NFR BLD: 0.5 K/UL (ref 0.1–0.95)
MONOCYTES NFR BLD: 5 % (ref 2–12)
NEUTROPHILS NFR BLD: 71 % (ref 43–80)
NEUTS SEG NFR BLD: 6.75 K/UL (ref 1.8–7.3)
NITRITE UR QL STRIP: NEGATIVE
PH UR STRIP: 7 [PH] (ref 5–9)
PLATELET # BLD AUTO: 189 K/UL (ref 130–450)
PMV BLD AUTO: 11 FL (ref 7–12)
POTASSIUM SERPL-SCNC: 3.9 MMOL/L (ref 3.5–5)
PROT SERPL-MCNC: 6.2 G/DL (ref 6.4–8.3)
PROT UR STRIP-MCNC: NEGATIVE MG/DL
RBC # BLD AUTO: 4.03 M/UL (ref 3.5–5.5)
RBC #/AREA URNS HPF: ABNORMAL /HPF
SODIUM SERPL-SCNC: 135 MMOL/L (ref 132–146)
SP GR UR STRIP: 1.01 (ref 1–1.03)
T4 FREE SERPL-MCNC: 1 NG/DL (ref 0.9–1.7)
TOTAL PROTEIN, URINE: 9 MG/DL (ref 0–12)
TSH SERPL DL<=0.05 MIU/L-ACNC: 1.67 UIU/ML (ref 0.27–4.2)
URATE SERPL-MCNC: 3.4 MG/DL (ref 2.4–5.7)
URINE TOTAL PROTEIN CREATININE RATIO: 0.13 (ref 0–0.2)
UROBILINOGEN UR STRIP-ACNC: 0.2 EU/DL (ref 0–1)
VIT B12 SERPL-MCNC: 293 PG/ML (ref 211–946)
WBC #/AREA URNS HPF: ABNORMAL /HPF
WBC OTHER # BLD: 9.6 K/UL (ref 4.5–11.5)

## 2023-10-05 PROCEDURE — 81001 URINALYSIS AUTO W/SCOPE: CPT

## 2023-10-05 PROCEDURE — 82570 ASSAY OF URINE CREATININE: CPT

## 2023-10-05 PROCEDURE — 80053 COMPREHEN METABOLIC PANEL: CPT

## 2023-10-05 PROCEDURE — 82607 VITAMIN B-12: CPT

## 2023-10-05 PROCEDURE — 84550 ASSAY OF BLOOD/URIC ACID: CPT

## 2023-10-05 PROCEDURE — 87077 CULTURE AEROBIC IDENTIFY: CPT

## 2023-10-05 PROCEDURE — 83615 LACTATE (LD) (LDH) ENZYME: CPT

## 2023-10-05 PROCEDURE — 82746 ASSAY OF FOLIC ACID SERUM: CPT

## 2023-10-05 PROCEDURE — 85025 COMPLETE CBC W/AUTO DIFF WBC: CPT

## 2023-10-05 PROCEDURE — 83036 HEMOGLOBIN GLYCOSYLATED A1C: CPT

## 2023-10-05 PROCEDURE — 85613 RUSSELL VIPER VENOM DILUTED: CPT

## 2023-10-05 PROCEDURE — 86376 MICROSOMAL ANTIBODY EACH: CPT

## 2023-10-05 PROCEDURE — 86800 THYROGLOBULIN ANTIBODY: CPT

## 2023-10-05 PROCEDURE — 84432 ASSAY OF THYROGLOBULIN: CPT

## 2023-10-05 PROCEDURE — 84443 ASSAY THYROID STIM HORMONE: CPT

## 2023-10-05 PROCEDURE — 36415 COLL VENOUS BLD VENIPUNCTURE: CPT

## 2023-10-05 PROCEDURE — 87086 URINE CULTURE/COLONY COUNT: CPT

## 2023-10-05 PROCEDURE — 83735 ASSAY OF MAGNESIUM: CPT

## 2023-10-05 PROCEDURE — 86146 BETA-2 GLYCOPROTEIN ANTIBODY: CPT

## 2023-10-05 PROCEDURE — 86147 CARDIOLIPIN ANTIBODY EA IG: CPT

## 2023-10-05 PROCEDURE — 82728 ASSAY OF FERRITIN: CPT

## 2023-10-05 PROCEDURE — 84156 ASSAY OF PROTEIN URINE: CPT

## 2023-10-05 PROCEDURE — 84439 ASSAY OF FREE THYROXINE: CPT

## 2023-10-05 PROCEDURE — 82306 VITAMIN D 25 HYDROXY: CPT

## 2023-10-06 LAB
DILUTE RUSSELL VIPER VENOM TIME: NEGATIVE
MICROORGANISM SPEC CULT: ABNORMAL
MICROORGANISM SPEC CULT: ABNORMAL
SPECIMEN DESCRIPTION: ABNORMAL

## 2023-10-09 ENCOUNTER — ROUTINE PRENATAL (OUTPATIENT)
Dept: OBGYN | Age: 33
End: 2023-10-09
Payer: COMMERCIAL

## 2023-10-09 ENCOUNTER — ANCILLARY PROCEDURE (OUTPATIENT)
Dept: OBGYN CLINIC | Age: 33
End: 2023-10-09
Payer: COMMERCIAL

## 2023-10-09 ENCOUNTER — ROUTINE PRENATAL (OUTPATIENT)
Dept: OBGYN CLINIC | Age: 33
End: 2023-10-09
Payer: COMMERCIAL

## 2023-10-09 VITALS
HEART RATE: 78 BPM | DIASTOLIC BLOOD PRESSURE: 76 MMHG | WEIGHT: 187.8 LBS | BODY MASS INDEX: 32.24 KG/M2 | SYSTOLIC BLOOD PRESSURE: 119 MMHG

## 2023-10-09 VITALS
DIASTOLIC BLOOD PRESSURE: 72 MMHG | HEART RATE: 78 BPM | BODY MASS INDEX: 32.12 KG/M2 | SYSTOLIC BLOOD PRESSURE: 112 MMHG | WEIGHT: 187.1 LBS

## 2023-10-09 DIAGNOSIS — R94.8 ABNORMAL PLACENTA FUNCTION TEST: ICD-10-CM

## 2023-10-09 DIAGNOSIS — Z3A.33 PREGNANCY WITH 33 COMPLETED WEEKS GESTATION: ICD-10-CM

## 2023-10-09 DIAGNOSIS — Z98.890 H/O LEEP: Primary | ICD-10-CM

## 2023-10-09 DIAGNOSIS — Z3A.33 33 WEEKS GESTATION OF PREGNANCY: ICD-10-CM

## 2023-10-09 DIAGNOSIS — R79.0 LOW FERRITIN: ICD-10-CM

## 2023-10-09 DIAGNOSIS — R79.89 LOW VITAMIN B12 LEVEL: ICD-10-CM

## 2023-10-09 LAB
B2 GLYCOPROT1 IGG SERPL IA-ACNC: <0.6 ELISA U/ML (ref 0–7)
B2 GLYCOPROT1 IGM SERPL IA-ACNC: 4.5 ELISA U/ML (ref 0–7)
CARDIOLIPIN IGA SER IA-ACNC: 3 APL (ref 0–14)
CARDIOLIPIN IGG SER IA-ACNC: 0.6 GPL (ref 0–10)
CARDIOLIPIN IGM SER IA-ACNC: 7.2 MPL (ref 0–10)
GLUCOSE URINE, POC: NEGATIVE
PROTEIN UA: POSITIVE

## 2023-10-09 PROCEDURE — G8484 FLU IMMUNIZE NO ADMIN: HCPCS | Performed by: MIDWIFE

## 2023-10-09 PROCEDURE — 81002 URINALYSIS NONAUTO W/O SCOPE: CPT | Performed by: MIDWIFE

## 2023-10-09 PROCEDURE — 76821 MIDDLE CEREBRAL ARTERY ECHO: CPT | Performed by: OBSTETRICS & GYNECOLOGY

## 2023-10-09 PROCEDURE — 76817 TRANSVAGINAL US OBSTETRIC: CPT | Performed by: OBSTETRICS & GYNECOLOGY

## 2023-10-09 PROCEDURE — 99214 OFFICE O/P EST MOD 30 MIN: CPT | Performed by: OBSTETRICS & GYNECOLOGY

## 2023-10-09 PROCEDURE — 99213 OFFICE O/P EST LOW 20 MIN: CPT | Performed by: MIDWIFE

## 2023-10-09 PROCEDURE — 99213 OFFICE O/P EST LOW 20 MIN: CPT | Performed by: OBSTETRICS & GYNECOLOGY

## 2023-10-09 PROCEDURE — G8419 CALC BMI OUT NRM PARAM NOF/U: HCPCS | Performed by: MIDWIFE

## 2023-10-09 PROCEDURE — 76820 UMBILICAL ARTERY ECHO: CPT | Performed by: OBSTETRICS & GYNECOLOGY

## 2023-10-09 PROCEDURE — 1036F TOBACCO NON-USER: CPT | Performed by: OBSTETRICS & GYNECOLOGY

## 2023-10-09 PROCEDURE — G8427 DOCREV CUR MEDS BY ELIG CLIN: HCPCS | Performed by: OBSTETRICS & GYNECOLOGY

## 2023-10-09 PROCEDURE — 76818 FETAL BIOPHYS PROFILE W/NST: CPT | Performed by: OBSTETRICS & GYNECOLOGY

## 2023-10-09 PROCEDURE — 1036F TOBACCO NON-USER: CPT | Performed by: MIDWIFE

## 2023-10-09 PROCEDURE — G8427 DOCREV CUR MEDS BY ELIG CLIN: HCPCS | Performed by: MIDWIFE

## 2023-10-09 PROCEDURE — G8419 CALC BMI OUT NRM PARAM NOF/U: HCPCS | Performed by: OBSTETRICS & GYNECOLOGY

## 2023-10-09 PROCEDURE — 99213 OFFICE O/P EST LOW 20 MIN: CPT

## 2023-10-09 PROCEDURE — G8484 FLU IMMUNIZE NO ADMIN: HCPCS | Performed by: OBSTETRICS & GYNECOLOGY

## 2023-10-09 PROCEDURE — 76815 OB US LIMITED FETUS(S): CPT | Performed by: OBSTETRICS & GYNECOLOGY

## 2023-10-09 RX ORDER — FERROUS SULFATE 325(65) MG
325 TABLET ORAL 2 TIMES DAILY
Qty: 60 TABLET | Refills: 2 | Status: SHIPPED | OUTPATIENT
Start: 2023-10-09

## 2023-10-09 RX ORDER — LANOLIN ALCOHOL/MO/W.PET/CERES
1000 CREAM (GRAM) TOPICAL DAILY
Qty: 30 TABLET | Refills: 3 | Status: SHIPPED | OUTPATIENT
Start: 2023-10-09

## 2023-10-09 NOTE — PROGRESS NOTES
, return OB at 33w0d weeks    Patient Active Problem List   Diagnosis    Family history of breast cancer gene mutation in first degree relative    H/O LEEP    Encounter for nonprocreative genetic counseling    Marginal insertion of umbilical cord affecting management of mother in second trimester    Family history of breast cancer        Subjective:  doing well. Feeling anxious about all the labwork that [facility] had her do. We reviewed all these results and I reassured her. Bleeding no   Leaking of fluid no   Painful cramping/contractions no   Headache no   Epigastric pain no   Edema no     Fetal movement good, patient reports 10 movements in 2 hours    Objective:  See flowsheet  Cervix 44.2mm on US last week    Vitals:    10/09/23 1350   BP: 119/76   Pulse: 78       FH per US today at [address] per US today at [facility]        Assessment:   at 33w0d   Blood pressure WNL  Size equals dates  Total weight gain appropriate   rhogam:  Not eligible    ICD-10-CM    1. H/O LEEP  Z98.890 POCT urine qual dipstick glucose     POCT urine qual dipstick protein           Plan:    RTO 2 weeks    Orders Placed This Encounter   Procedures    POCT urine qual dipstick glucose    POCT urine qual dipstick protein      Fetal movement:  Baby should move 10 times every 2 hours. If movements decrease below 10 in 2 hours, or decrease from what is typical for that pregnancy, patient should eat something, drink something, and lay down to count movements. If she does not feel 10 movements after doing these things, she is to immediately proceed to L&D for NST. She should NOT WAIT until the next day.  labor precautions discussed with patient. Patient should report >4 contractions/tightenings in 1 hour after first emptying bladder, laying down, drinking 2 large glasses of water. Should also promptly report any vaginal bleeding, menstrual-type cramping, dysuria, urgency or frequency.

## 2023-10-09 NOTE — PATIENT INSTRUCTIONS

## 2023-10-09 NOTE — PROGRESS NOTES
Patient alert and pleasant with no complaints. Here today for routine prenatal visit. Fetal heart tones assessed without difficulty  Urine for glucose and protein obtained with negative results. Discharge instructions have been discussed with the patient. Patient agrees to call the office with any questions or concerns. Patient verbalized understanding.

## 2023-10-09 NOTE — PROGRESS NOTES
Pt here for f/u, she seen Abdi Yi prior to appointment  She has no concerns  Denies bleeding/cramping/lof  Good fetal movement

## 2023-10-11 PROBLEM — R80.9 URINE PROTEIN INCREASED: Status: ACTIVE | Noted: 2023-10-11

## 2023-10-11 PROBLEM — O36.63X0 EXCESSIVE FETAL GROWTH AFFECTING MANAGEMENT OF MOTHER IN THIRD TRIMESTER, ANTEPARTUM: Status: ACTIVE | Noted: 2023-10-11

## 2023-10-11 PROBLEM — R94.8 ABNORMAL PLACENTA FUNCTION TEST: Status: ACTIVE | Noted: 2023-10-11

## 2023-10-11 LAB
THYROGLOBULIN AB: 27 IU/ML (ref 0–40)
THYROPEROXIDASE AB SERPL IA-ACNC: 14 IU/ML (ref 0–25)

## 2023-10-16 ENCOUNTER — ANCILLARY PROCEDURE (OUTPATIENT)
Dept: OBGYN CLINIC | Age: 33
End: 2023-10-16
Payer: COMMERCIAL

## 2023-10-16 ENCOUNTER — ROUTINE PRENATAL (OUTPATIENT)
Dept: OBGYN CLINIC | Age: 33
End: 2023-10-16
Payer: COMMERCIAL

## 2023-10-16 VITALS
DIASTOLIC BLOOD PRESSURE: 67 MMHG | HEART RATE: 87 BPM | BODY MASS INDEX: 32.12 KG/M2 | SYSTOLIC BLOOD PRESSURE: 100 MMHG | WEIGHT: 187.13 LBS

## 2023-10-16 DIAGNOSIS — R94.8 ABNORMAL PLACENTA FUNCTION TEST: ICD-10-CM

## 2023-10-16 DIAGNOSIS — O35.EXX0 PYELECTASIS OF FETUS ON PRENATAL ULTRASOUND: ICD-10-CM

## 2023-10-16 DIAGNOSIS — Z3A.34 34 WEEKS GESTATION OF PREGNANCY: Primary | ICD-10-CM

## 2023-10-16 PROBLEM — R79.0 LOW FERRITIN: Status: ACTIVE | Noted: 2023-10-16

## 2023-10-16 PROBLEM — R79.89 LOW VITAMIN B12 LEVEL: Status: ACTIVE | Noted: 2023-10-16

## 2023-10-16 LAB
GLUCOSE URINE, POC: NEGATIVE
PROTEIN UA: NEGATIVE

## 2023-10-16 PROCEDURE — G8427 DOCREV CUR MEDS BY ELIG CLIN: HCPCS | Performed by: OBSTETRICS & GYNECOLOGY

## 2023-10-16 PROCEDURE — 76819 FETAL BIOPHYS PROFIL W/O NST: CPT | Performed by: OBSTETRICS & GYNECOLOGY

## 2023-10-16 PROCEDURE — G8484 FLU IMMUNIZE NO ADMIN: HCPCS | Performed by: OBSTETRICS & GYNECOLOGY

## 2023-10-16 PROCEDURE — 76821 MIDDLE CEREBRAL ARTERY ECHO: CPT | Performed by: OBSTETRICS & GYNECOLOGY

## 2023-10-16 PROCEDURE — 81002 URINALYSIS NONAUTO W/O SCOPE: CPT | Performed by: OBSTETRICS & GYNECOLOGY

## 2023-10-16 PROCEDURE — 99212 OFFICE O/P EST SF 10 MIN: CPT | Performed by: OBSTETRICS & GYNECOLOGY

## 2023-10-16 PROCEDURE — 76820 UMBILICAL ARTERY ECHO: CPT | Performed by: OBSTETRICS & GYNECOLOGY

## 2023-10-16 PROCEDURE — 76818 FETAL BIOPHYS PROFILE W/NST: CPT | Performed by: OBSTETRICS & GYNECOLOGY

## 2023-10-16 PROCEDURE — 76815 OB US LIMITED FETUS(S): CPT | Performed by: OBSTETRICS & GYNECOLOGY

## 2023-10-16 PROCEDURE — 1036F TOBACCO NON-USER: CPT | Performed by: OBSTETRICS & GYNECOLOGY

## 2023-10-16 PROCEDURE — G8419 CALC BMI OUT NRM PARAM NOF/U: HCPCS | Performed by: OBSTETRICS & GYNECOLOGY

## 2023-10-16 PROCEDURE — 99213 OFFICE O/P EST LOW 20 MIN: CPT | Performed by: OBSTETRICS & GYNECOLOGY

## 2023-10-16 PROCEDURE — 76817 TRANSVAGINAL US OBSTETRIC: CPT | Performed by: OBSTETRICS & GYNECOLOGY

## 2023-10-16 NOTE — PROGRESS NOTES
Pt being seen for weekly visit. Pt denies bleeding,cramping,contractions or fluid leakage. Good fetal movement. Pt denies any complaints today.

## 2023-10-23 ENCOUNTER — ANCILLARY PROCEDURE (OUTPATIENT)
Dept: OBGYN CLINIC | Age: 33
End: 2023-10-23
Payer: COMMERCIAL

## 2023-10-23 ENCOUNTER — ROUTINE PRENATAL (OUTPATIENT)
Dept: OBGYN CLINIC | Age: 33
End: 2023-10-23
Payer: COMMERCIAL

## 2023-10-23 VITALS
WEIGHT: 187.4 LBS | DIASTOLIC BLOOD PRESSURE: 77 MMHG | BODY MASS INDEX: 32.17 KG/M2 | SYSTOLIC BLOOD PRESSURE: 109 MMHG | HEART RATE: 78 BPM

## 2023-10-23 DIAGNOSIS — R94.8 ABNORMAL PLACENTA FUNCTION TEST: ICD-10-CM

## 2023-10-23 DIAGNOSIS — R79.0 LOW FERRITIN: ICD-10-CM

## 2023-10-23 DIAGNOSIS — Z3A.35 35 WEEKS GESTATION OF PREGNANCY: ICD-10-CM

## 2023-10-23 DIAGNOSIS — R79.89 LOW VITAMIN B12 LEVEL: ICD-10-CM

## 2023-10-23 DIAGNOSIS — R80.9 URINE PROTEIN INCREASED: ICD-10-CM

## 2023-10-23 DIAGNOSIS — O35.EXX0 PYELECTASIS OF FETUS ON PRENATAL ULTRASOUND: ICD-10-CM

## 2023-10-23 DIAGNOSIS — Z71.83 ENCOUNTER FOR NONPROCREATIVE GENETIC COUNSELING: ICD-10-CM

## 2023-10-23 DIAGNOSIS — Z98.890 H/O LEEP: Primary | ICD-10-CM

## 2023-10-23 DIAGNOSIS — O43.192 MARGINAL INSERTION OF UMBILICAL CORD AFFECTING MANAGEMENT OF MOTHER IN SECOND TRIMESTER: ICD-10-CM

## 2023-10-23 LAB
GLUCOSE URINE, POC: NEGATIVE
PROTEIN UA: NEGATIVE

## 2023-10-23 PROCEDURE — 76816 OB US FOLLOW-UP PER FETUS: CPT | Performed by: OBSTETRICS & GYNECOLOGY

## 2023-10-23 PROCEDURE — 99213 OFFICE O/P EST LOW 20 MIN: CPT | Performed by: OBSTETRICS & GYNECOLOGY

## 2023-10-23 PROCEDURE — 76821 MIDDLE CEREBRAL ARTERY ECHO: CPT | Performed by: OBSTETRICS & GYNECOLOGY

## 2023-10-23 PROCEDURE — 81002 URINALYSIS NONAUTO W/O SCOPE: CPT | Performed by: OBSTETRICS & GYNECOLOGY

## 2023-10-23 PROCEDURE — 76818 FETAL BIOPHYS PROFILE W/NST: CPT | Performed by: OBSTETRICS & GYNECOLOGY

## 2023-10-23 PROCEDURE — 76820 UMBILICAL ARTERY ECHO: CPT | Performed by: OBSTETRICS & GYNECOLOGY

## 2023-10-23 NOTE — PROGRESS NOTES
Pt here for f/u  She has no concerns  Denies bleeding/cramping/lof  Good fetal movement
moderate or severe hydronephrosis. The grade of dilatation may increase with advancing gestation, resulting in a reduction of the cortical thickness. The different etiologies of pyelectasis including vesicoureteral reflux, ureteropelvic junction obstruction and vesicoureteric junction obstruction were discussed. The association of pyelectasis with Down syndrome was discussed with the patient. The patient previously had a quad screen that was noted to be low risk for trisomy 24. No other markers for aneuploidy were seen. The appearance of the fetal kidneys and amniotic fluid index will be monitored serially. Fetal growth should also be monitored serially. -- Fetal growth was appropriate for gestational age 26 weeks 2 days, the [facility] was measuring 2 weeks ahead. --Fetal growth appropriate/large for gestational age 27 weeks 0 days     I explained to her that  follow up is important. She should notify the pediatrician to order a  renal ultrasound with the possibility of referral to pediatric urologist if the pyelectasis persists. If the pyelectasis is related to the hormonal effects pregnancy, most cases resolve after delivery. .  However, some cases will persist/progress and require surgery. --The patient will be added to the NICU list to ensure  follow-up. 8.  Large abdominal circumference  -- The ultrasound from 32 weeks 2 days was reviewed with patient. The overall estimate of fetal weight was 5 pounds ounces, 79 percentile. The [facility] was at the 93rd percentile and measuring 2 weeks ahead. Fetal growth was repeated at 35 weeks 0 days. The composite gestational age was 42 weeks 1 day. Estimated fetal weight was 6 pounds 7 ounces, 83rd percentile. The [facility] was measuring 2 weeks ahead and at the 96 percentile. Counseling was previously provided. Counseling was reviewed. A large abdominal circumference can be a marker for underlying hyperglycemia.   A screening

## 2023-10-23 NOTE — PATIENT INSTRUCTIONS

## 2023-10-30 ENCOUNTER — ROUTINE PRENATAL (OUTPATIENT)
Dept: OBGYN | Age: 33
End: 2023-10-30
Payer: COMMERCIAL

## 2023-10-30 ENCOUNTER — ANCILLARY PROCEDURE (OUTPATIENT)
Dept: OBGYN CLINIC | Age: 33
End: 2023-10-30
Payer: COMMERCIAL

## 2023-10-30 ENCOUNTER — ROUTINE PRENATAL (OUTPATIENT)
Dept: OBGYN CLINIC | Age: 33
End: 2023-10-30
Payer: COMMERCIAL

## 2023-10-30 VITALS
HEART RATE: 68 BPM | SYSTOLIC BLOOD PRESSURE: 119 MMHG | WEIGHT: 188.6 LBS | DIASTOLIC BLOOD PRESSURE: 78 MMHG | BODY MASS INDEX: 32.37 KG/M2

## 2023-10-30 VITALS
HEART RATE: 82 BPM | DIASTOLIC BLOOD PRESSURE: 71 MMHG | WEIGHT: 187 LBS | BODY MASS INDEX: 32.1 KG/M2 | SYSTOLIC BLOOD PRESSURE: 108 MMHG

## 2023-10-30 DIAGNOSIS — O35.9XX0 FETAL ABNORMALITY AFFECTING MANAGEMENT OF MOTHER, SINGLE OR UNSPECIFIED FETUS: ICD-10-CM

## 2023-10-30 DIAGNOSIS — R79.89 LOW VITAMIN B12 LEVEL: ICD-10-CM

## 2023-10-30 DIAGNOSIS — O35.EXX0 PYELECTASIS OF FETUS ON PRENATAL ULTRASOUND: ICD-10-CM

## 2023-10-30 DIAGNOSIS — Z34.93 ENCOUNTER FOR SUPERVISION OF NORMAL PREGNANCY IN THIRD TRIMESTER, UNSPECIFIED GRAVIDITY: Primary | ICD-10-CM

## 2023-10-30 DIAGNOSIS — Z98.890 H/O LEEP: ICD-10-CM

## 2023-10-30 DIAGNOSIS — R80.9 URINE PROTEIN INCREASED: ICD-10-CM

## 2023-10-30 DIAGNOSIS — R79.0 LOW FERRITIN: ICD-10-CM

## 2023-10-30 DIAGNOSIS — Z34.93 ENCOUNTER FOR SUPERVISION OF NORMAL PREGNANCY IN THIRD TRIMESTER, UNSPECIFIED GRAVIDITY: ICD-10-CM

## 2023-10-30 LAB
GLUCOSE URINE, POC: NEGATIVE
PROTEIN UA: POSITIVE

## 2023-10-30 PROCEDURE — G8419 CALC BMI OUT NRM PARAM NOF/U: HCPCS | Performed by: MIDWIFE

## 2023-10-30 PROCEDURE — 76815 OB US LIMITED FETUS(S): CPT | Performed by: OBSTETRICS & GYNECOLOGY

## 2023-10-30 PROCEDURE — G8419 CALC BMI OUT NRM PARAM NOF/U: HCPCS | Performed by: OBSTETRICS & GYNECOLOGY

## 2023-10-30 PROCEDURE — 76821 MIDDLE CEREBRAL ARTERY ECHO: CPT | Performed by: OBSTETRICS & GYNECOLOGY

## 2023-10-30 PROCEDURE — G8427 DOCREV CUR MEDS BY ELIG CLIN: HCPCS | Performed by: MIDWIFE

## 2023-10-30 PROCEDURE — 99213 OFFICE O/P EST LOW 20 MIN: CPT | Performed by: MIDWIFE

## 2023-10-30 PROCEDURE — 76820 UMBILICAL ARTERY ECHO: CPT | Performed by: OBSTETRICS & GYNECOLOGY

## 2023-10-30 PROCEDURE — 1036F TOBACCO NON-USER: CPT | Performed by: MIDWIFE

## 2023-10-30 PROCEDURE — 1036F TOBACCO NON-USER: CPT | Performed by: OBSTETRICS & GYNECOLOGY

## 2023-10-30 PROCEDURE — 99213 OFFICE O/P EST LOW 20 MIN: CPT | Performed by: OBSTETRICS & GYNECOLOGY

## 2023-10-30 PROCEDURE — G8484 FLU IMMUNIZE NO ADMIN: HCPCS | Performed by: OBSTETRICS & GYNECOLOGY

## 2023-10-30 PROCEDURE — 76818 FETAL BIOPHYS PROFILE W/NST: CPT | Performed by: OBSTETRICS & GYNECOLOGY

## 2023-10-30 PROCEDURE — 81002 URINALYSIS NONAUTO W/O SCOPE: CPT | Performed by: OBSTETRICS & GYNECOLOGY

## 2023-10-30 PROCEDURE — G8427 DOCREV CUR MEDS BY ELIG CLIN: HCPCS | Performed by: OBSTETRICS & GYNECOLOGY

## 2023-10-30 PROCEDURE — G8484 FLU IMMUNIZE NO ADMIN: HCPCS | Performed by: MIDWIFE

## 2023-10-30 NOTE — PROGRESS NOTES
, return OB at 36w0d weeks    Patient Active Problem List   Diagnosis    Family history of breast cancer gene mutation in first degree relative    H/O LEEP    Encounter for nonprocreative genetic counseling    Marginal insertion of umbilical cord affecting management of mother in second trimester    Family history of breast cancer    BMI 31.0-31.9,adult    Excessive fetal growth affecting management of mother in third trimester, antepartum    Urine protein increased    Abnormal placenta function test    Pyelectasis of fetus on prenatal ultrasound    Low ferritin    Low vitamin B12 level    Fetal abnormality affecting management of mother        Subjective:  doing well    Bleeding no   Leaking of fluid no   Painful cramping/contractions no   Headache no   Epigastric pain no   Edema no     Fetal movement good, patient reports 10 movements in 2 hours    Objective:  See flowsheet    Vitals:    10/30/23 1014   BP: 119/78   Pulse: 68       FH per MFM today  FHT per MFM        Assessment:   at 36w0d   Blood pressure WNL  Size equals dates  Total weight gain appropriate   rhogam:  Not eligible    ICD-10-CM    1. Encounter for supervision of normal pregnancy in third trimester, unspecified   Z34.93 Culture, Strep B Screen, Vaginal/Rectal      2. Fetal abnormality affecting management of mother, single or unspecified fetus  O35. 9XX0            Plan:    RTO 1 weeks    Orders Placed This Encounter   Procedures    Culture, Strep B Screen, Vaginal/Rectal     Standing Status:   Future     Standing Expiration Date:   10/30/2024      Fetal movement:  Baby should move 10 times every 2 hours. If movements decrease below 10 in 2 hours, or decrease from what is typical for that pregnancy, patient should eat something, drink something, and lay down to count movements. If she does not feel 10 movements after doing these things, she is to immediately proceed to L&D for NST.   She should NOT WAIT until the

## 2023-10-30 NOTE — PROGRESS NOTES
Patient alert and pleasant with no complaints. Here today for prenatal visit. Already seen at [facility] this am.   Culture obtained, labeled and sent to the lab. Discharge instructions have been discussed with the patient. Patient advised to call our office with any questions or concerns. Voiced understanding.

## 2023-10-30 NOTE — PROGRESS NOTES
Patient alert and pleasant today  Here today for nst/bpp. Patient complaining of mild cramping. Denies bleeding or LOF. Patient states good fetal movement.
of hydration of the mother, the grade of distention of the fetal bladder and recent emptying of the bladder itself. Some cases of mild pyelectasis evolve into moderate or severe hydronephrosis. The grade of dilatation may increase with advancing gestation, resulting in a reduction of the cortical thickness. The different etiologies of pyelectasis including vesicoureteral reflux, ureteropelvic junction obstruction and vesicoureteric junction obstruction were discussed. The association of pyelectasis with Down syndrome was discussed with the patient. The patient previously had a quad screen that was noted to be low risk for trisomy 24. No other markers for aneuploidy were seen. The appearance of the fetal kidneys and amniotic fluid index will be monitored serially. Fetal growth should also be monitored serially. --Fetal growth was appropriate for gestational age 26 weeks 2 days, the [facility] was measuring 2 weeks ahead. --Fetal growth appropriate/large for gestational age 27 weeks 0 days     I explained to her that  follow up is important. She should notify the pediatrician to order a  renal ultrasound with the possibility of referral to pediatric urologist if the pyelectasis persists. If the pyelectasis is related to the hormonal effects pregnancy, most cases resolve after delivery. .  However, some cases will persist/progress and require surgery. --The patient will be added to the NICU list to help ensure  follow-up. 8.  Large abdominal circumference  -- The ultrasound from 32 weeks 2 days was reviewed with patient. The overall estimate of fetal weight was 5 pounds ounces, 79 percentile. The [facility] was at the 93rd percentile and measuring 2 weeks ahead. Fetal growth was repeated at 35 weeks 0 days. The composite gestational age was 42 weeks 1 day. Estimated fetal weight was 6 pounds 7 ounces, 83rd percentile.   The [facility] was measuring 2 weeks ahead and at the 96th

## 2023-11-02 LAB
CULTURE: ABNORMAL
SPECIMEN DESCRIPTION: ABNORMAL

## 2023-11-06 ENCOUNTER — ANCILLARY PROCEDURE (OUTPATIENT)
Dept: OBGYN CLINIC | Age: 33
End: 2023-11-06
Payer: COMMERCIAL

## 2023-11-06 ENCOUNTER — ROUTINE PRENATAL (OUTPATIENT)
Dept: OBGYN | Age: 33
End: 2023-11-06
Payer: COMMERCIAL

## 2023-11-06 ENCOUNTER — ROUTINE PRENATAL (OUTPATIENT)
Dept: OBGYN CLINIC | Age: 33
End: 2023-11-06
Payer: COMMERCIAL

## 2023-11-06 VITALS
HEART RATE: 88 BPM | SYSTOLIC BLOOD PRESSURE: 110 MMHG | WEIGHT: 188.2 LBS | DIASTOLIC BLOOD PRESSURE: 76 MMHG | BODY MASS INDEX: 32.3 KG/M2

## 2023-11-06 VITALS — SYSTOLIC BLOOD PRESSURE: 110 MMHG | DIASTOLIC BLOOD PRESSURE: 74 MMHG | WEIGHT: 190.4 LBS | BODY MASS INDEX: 32.68 KG/M2

## 2023-11-06 DIAGNOSIS — O43.192 MARGINAL INSERTION OF UMBILICAL CORD AFFECTING MANAGEMENT OF MOTHER IN SECOND TRIMESTER: ICD-10-CM

## 2023-11-06 DIAGNOSIS — R79.89 LOW VITAMIN B12 LEVEL: ICD-10-CM

## 2023-11-06 DIAGNOSIS — O36.63X0 EXCESSIVE FETAL GROWTH AFFECTING MANAGEMENT OF PREGNANCY IN THIRD TRIMESTER, SINGLE OR UNSPECIFIED FETUS: ICD-10-CM

## 2023-11-06 DIAGNOSIS — R80.9 URINE PROTEIN INCREASED: ICD-10-CM

## 2023-11-06 DIAGNOSIS — R79.0 LOW FERRITIN: ICD-10-CM

## 2023-11-06 DIAGNOSIS — O36.63X0 EXCESSIVE FETAL GROWTH AFFECTING MANAGEMENT OF PREGNANCY IN THIRD TRIMESTER, SINGLE OR UNSPECIFIED FETUS: Primary | ICD-10-CM

## 2023-11-06 DIAGNOSIS — Z3A.37 PREGNANCY WITH 37 WEEKS COMPLETED GESTATION: ICD-10-CM

## 2023-11-06 DIAGNOSIS — Z3A.37 37 WEEKS GESTATION OF PREGNANCY: Primary | ICD-10-CM

## 2023-11-06 DIAGNOSIS — Z98.890 H/O LEEP: ICD-10-CM

## 2023-11-06 DIAGNOSIS — O35.EXX0 PYELECTASIS OF FETUS ON PRENATAL ULTRASOUND: ICD-10-CM

## 2023-11-06 DIAGNOSIS — B95.1 POSITIVE GBS TEST: ICD-10-CM

## 2023-11-06 LAB
GLUCOSE URINE, POC: NEGATIVE
PROTEIN UA: POSITIVE

## 2023-11-06 PROCEDURE — 99213 OFFICE O/P EST LOW 20 MIN: CPT | Performed by: MIDWIFE

## 2023-11-06 PROCEDURE — 81002 URINALYSIS NONAUTO W/O SCOPE: CPT | Performed by: OBSTETRICS & GYNECOLOGY

## 2023-11-06 PROCEDURE — 1036F TOBACCO NON-USER: CPT | Performed by: MIDWIFE

## 2023-11-06 PROCEDURE — 99213 OFFICE O/P EST LOW 20 MIN: CPT | Performed by: OBSTETRICS & GYNECOLOGY

## 2023-11-06 PROCEDURE — 76820 UMBILICAL ARTERY ECHO: CPT | Performed by: OBSTETRICS & GYNECOLOGY

## 2023-11-06 PROCEDURE — 76821 MIDDLE CEREBRAL ARTERY ECHO: CPT | Performed by: OBSTETRICS & GYNECOLOGY

## 2023-11-06 PROCEDURE — 76818 FETAL BIOPHYS PROFILE W/NST: CPT | Performed by: OBSTETRICS & GYNECOLOGY

## 2023-11-06 PROCEDURE — G8484 FLU IMMUNIZE NO ADMIN: HCPCS | Performed by: MIDWIFE

## 2023-11-06 PROCEDURE — G8419 CALC BMI OUT NRM PARAM NOF/U: HCPCS | Performed by: MIDWIFE

## 2023-11-06 PROCEDURE — G8427 DOCREV CUR MEDS BY ELIG CLIN: HCPCS | Performed by: MIDWIFE

## 2023-11-06 PROCEDURE — 76815 OB US LIMITED FETUS(S): CPT | Performed by: OBSTETRICS & GYNECOLOGY

## 2023-11-06 NOTE — PATIENT INSTRUCTIONS

## 2023-11-06 NOTE — PROGRESS NOTES
, return OB at 37w0d weeks    Patient Active Problem List   Diagnosis    Family history of breast cancer gene mutation in first degree relative    H/O LEEP    Encounter for nonprocreative genetic counseling    Marginal insertion of umbilical cord affecting management of mother in second trimester    Family history of breast cancer    BMI 31.0-31.9,adult    Excessive fetal growth affecting management of mother in third trimester, antepartum    Urine protein increased    Abnormal placenta function test    Pyelectasis of fetus on prenatal ultrasound    Low ferritin    Low vitamin B12 level    Fetal abnormality affecting management of mother    Positive GBS test        Subjective:  doing well  Discussed labor precautions, + GBS and when to call/come to hospital.  Phone number written down for her    Bleeding no   Leaking of fluid no   Painful cramping/contractions no   Headache no   Epigastric pain no   Edema no     Fetal movement good, patient reports 10 movements in 2 hours    Objective:  See flowsheet  Vitals:    23 1045   BP: 110/74         Assessment:   at 37w0d   Blood pressure WNL  Size equals dates  Total weight gain appropriate  Not eligible  GBS positive    ICD-10-CM    1. Excessive fetal growth affecting management of pregnancy in third trimester, single or unspecified fetus  O36.63X0       2. Marginal insertion of umbilical cord affecting management of mother in second trimester  O43.192       3. Pregnancy with 37 weeks completed gestation  Z3A.37       4. Positive GBS test  B95.1            Plan:    RTO 1 weeks  No orders of the defined types were placed in this encounter. Fetal movement:  Baby should move 10 times every 2 hours. If movements decrease below 10 in 2 hours, or decrease from what is typical for that pregnancy, patient should eat something, drink something, and lay down to count movements.   If she does not feel 10 movements after doing these things, she is to

## 2023-11-06 NOTE — PROGRESS NOTES
Pt here for bpp/nst  She has no concerns  She denies bleeding/cramping/lof  Good fetal movement
patient. The overall estimate of fetal weight was 5 pounds ounces, 79 percentile. The [facility] was at the 93rd percentile and measuring 2 weeks ahead. Fetal growth was repeated at 35 weeks 0 days. The composite gestational age was 42 weeks 1 day. Estimated fetal weight was 6 pounds 7 ounces, 83rd percentile. The [facility] was measuring 2 weeks ahead and at the 96 percentile. Counseling was previously provided. Counseling was reviewed. A large abdominal circumference can be a marker for underlying hyperglycemia. A screening hemoglobin A1c was ordered. -- The patient's Glucola was normal at 76 on 9/8/2023     A large abdominal circumference can be a marker for underlying hyperglycemia and may increase the risk for complications of delivery such as protracted labor or a shoulder dystocia. Thus, precautions should be taken. Fetal growth will be reevaluated in 1-2 weeks. 9. Low ferritin -- The patient's ferritin was low at 16 (considered low if <15 in absence of anemia or <40 in setting of anemia)  --Ferrous sulfate 325 mg BID prescribed  --Monitor levels serially  --Monitor nutrition panel q4-6 weeks (CBC, CMP, magnesium, ferritin, folate, vitamin B12, vitamin D25OH), on/after 11/6/2023    --Repeat testing ordered  --Follow up with PCP for long term monitoring and management     10. Low vitamin B12 -- The patient's vitamin B12 level was borderline at 293. Individuals with vitamin B12 level between 200 and 400 are increased risk for anemia and side effects related to low vitamin B12. Thus, supplementation is recommended  --Recommend vitamin B12, 1000 mcg daily  --Monitor levels serially  --Repeat nutrition panel (CBC, CMP, magnesium, ferritin, folate, vitamin B12, vitamin D 25 OH) in 4 weeks, on/after 11/6/2023    --Repeat testing ordered  --Long-term follow-up with PCP for monitoring and management     11.   Positive urine culture -- The patient's urine culture from 10/5/2023 was positive for

## 2023-11-06 NOTE — PROGRESS NOTES
Patient alert and pleasant today with no complaints. Here today for prenatal visit. Patient seen earlier at [address] [facility]. Discharge instructions given and patient directed to call the office with any questions or concerns and verbalized understanding.

## 2023-11-13 ENCOUNTER — ANCILLARY PROCEDURE (OUTPATIENT)
Dept: OBGYN CLINIC | Age: 33
End: 2023-11-13
Payer: COMMERCIAL

## 2023-11-13 ENCOUNTER — ROUTINE PRENATAL (OUTPATIENT)
Dept: OBGYN | Age: 33
End: 2023-11-13
Payer: COMMERCIAL

## 2023-11-13 ENCOUNTER — ROUTINE PRENATAL (OUTPATIENT)
Dept: OBGYN CLINIC | Age: 33
End: 2023-11-13
Payer: COMMERCIAL

## 2023-11-13 VITALS
SYSTOLIC BLOOD PRESSURE: 121 MMHG | HEART RATE: 72 BPM | DIASTOLIC BLOOD PRESSURE: 78 MMHG | BODY MASS INDEX: 32.87 KG/M2 | WEIGHT: 191.5 LBS

## 2023-11-13 VITALS
SYSTOLIC BLOOD PRESSURE: 110 MMHG | WEIGHT: 190 LBS | DIASTOLIC BLOOD PRESSURE: 75 MMHG | BODY MASS INDEX: 32.61 KG/M2 | HEART RATE: 93 BPM

## 2023-11-13 DIAGNOSIS — R79.0 LOW FERRITIN: ICD-10-CM

## 2023-11-13 DIAGNOSIS — Z98.890 H/O LEEP: Primary | ICD-10-CM

## 2023-11-13 DIAGNOSIS — B95.1 POSITIVE GBS TEST: ICD-10-CM

## 2023-11-13 DIAGNOSIS — Z3A.38 38 WEEKS GESTATION OF PREGNANCY: ICD-10-CM

## 2023-11-13 DIAGNOSIS — Z3A.38 PREGNANCY WITH 38 COMPLETED WEEKS GESTATION: ICD-10-CM

## 2023-11-13 DIAGNOSIS — O43.192 MARGINAL INSERTION OF UMBILICAL CORD AFFECTING MANAGEMENT OF MOTHER IN SECOND TRIMESTER: ICD-10-CM

## 2023-11-13 DIAGNOSIS — O35.9XX0 FETAL ABNORMALITY AFFECTING MANAGEMENT OF MOTHER, SINGLE OR UNSPECIFIED FETUS: ICD-10-CM

## 2023-11-13 DIAGNOSIS — R79.89 LOW VITAMIN B12 LEVEL: ICD-10-CM

## 2023-11-13 DIAGNOSIS — O35.EXX0 PYELECTASIS OF FETUS ON PRENATAL ULTRASOUND: ICD-10-CM

## 2023-11-13 DIAGNOSIS — O36.63X0 EXCESSIVE FETAL GROWTH AFFECTING MANAGEMENT OF PREGNANCY IN THIRD TRIMESTER, SINGLE OR UNSPECIFIED FETUS: ICD-10-CM

## 2023-11-13 LAB
GLUCOSE URINE, POC: NEGATIVE
PROTEIN UA: NEGATIVE

## 2023-11-13 PROCEDURE — G8484 FLU IMMUNIZE NO ADMIN: HCPCS | Performed by: OBSTETRICS & GYNECOLOGY

## 2023-11-13 PROCEDURE — G8484 FLU IMMUNIZE NO ADMIN: HCPCS | Performed by: MIDWIFE

## 2023-11-13 PROCEDURE — 81002 URINALYSIS NONAUTO W/O SCOPE: CPT | Performed by: OBSTETRICS & GYNECOLOGY

## 2023-11-13 PROCEDURE — 76818 FETAL BIOPHYS PROFILE W/NST: CPT | Performed by: OBSTETRICS & GYNECOLOGY

## 2023-11-13 PROCEDURE — 99213 OFFICE O/P EST LOW 20 MIN: CPT | Performed by: MIDWIFE

## 2023-11-13 PROCEDURE — 99214 OFFICE O/P EST MOD 30 MIN: CPT | Performed by: OBSTETRICS & GYNECOLOGY

## 2023-11-13 PROCEDURE — 76821 MIDDLE CEREBRAL ARTERY ECHO: CPT | Performed by: OBSTETRICS & GYNECOLOGY

## 2023-11-13 PROCEDURE — G8427 DOCREV CUR MEDS BY ELIG CLIN: HCPCS | Performed by: MIDWIFE

## 2023-11-13 PROCEDURE — 99213 OFFICE O/P EST LOW 20 MIN: CPT | Performed by: OBSTETRICS & GYNECOLOGY

## 2023-11-13 PROCEDURE — 76820 UMBILICAL ARTERY ECHO: CPT | Performed by: OBSTETRICS & GYNECOLOGY

## 2023-11-13 PROCEDURE — 76816 OB US FOLLOW-UP PER FETUS: CPT | Performed by: OBSTETRICS & GYNECOLOGY

## 2023-11-13 PROCEDURE — G8419 CALC BMI OUT NRM PARAM NOF/U: HCPCS | Performed by: OBSTETRICS & GYNECOLOGY

## 2023-11-13 PROCEDURE — 1036F TOBACCO NON-USER: CPT | Performed by: OBSTETRICS & GYNECOLOGY

## 2023-11-13 PROCEDURE — 0502F SUBSEQUENT PRENATAL CARE: CPT | Performed by: MIDWIFE

## 2023-11-13 PROCEDURE — G8419 CALC BMI OUT NRM PARAM NOF/U: HCPCS | Performed by: MIDWIFE

## 2023-11-13 PROCEDURE — G8427 DOCREV CUR MEDS BY ELIG CLIN: HCPCS | Performed by: OBSTETRICS & GYNECOLOGY

## 2023-11-13 PROCEDURE — 1036F TOBACCO NON-USER: CPT | Performed by: MIDWIFE

## 2023-11-13 NOTE — PROGRESS NOTES
Patient alert and pleasant today with no complaints. Here today for prenatal visit. Patient seen prior today MF. Fetal heart tones obtained without difficulty. Discharge instructions given and patient directed to call the office with any questions or concerns and verbalized understanding.

## 2023-11-13 NOTE — PROGRESS NOTES
Pt here for bpp/nst  Pt denies any bleeding/cramping/lof  Pt states good fetal movement
weeks 0 days, patient's cervix measured 5 cm without funneling. At 28 weeks 2 days, her cervix measured 4.3 cm without funneling. At 32 weeks 2 days, the patient's cervix measured 3.5 cm without funneling. At 34 weeks 0 days, her cervix measured 3.9 cm without funneling. Transvaginal imaging was not repeated at 38 weeks gestation. 3.   Marginal umbilical cord insertion into the placenta --  The ultrasound results were reviewed with the patient. The umbilical cord inserts into the superior portion of the placenta, approximately 1.6 cm from the edge. Thus, there is a marginal umbilical cord insertion. Counseling was previously provided to the patient. A marginal cord insertion is diagnosed when the umbilical cord inserts into the placenta within 2 cm of the edge of the placenta. A marginal cord insertion is seen in approximately 6% of pregnancies. There is typically a normal amount of Orchard's jelly in the cord. This is usually a benign finding, however, there can be an increased risk for poor fetal growth. Fetal growth should be monitored serially. --Fetal growth was appropriate for the gestational age at 23 weeks 0 days  --Fetal growth was appropriate for the gestational age at 35 weeks 2 days  --Fetal growth was appropriate for the gestational age at 26 weeks 2 days  --Fetal growth was appropriate/large for the gestational age 27 weeks 0 days  --Fetal growth was large for the gestational age at 37 weeks 0 days     Fetal growth should be reevaluated in 2-3 weeks. 4.  Family history of cancer -- The patient's family history was previously reviewed and notable for breast cancer in her maternal aunt. She was encouraged to find out if her aunt had genetic testing. She reported that her aunt was BRCA positive. She reports that her mother was tested and negative. She was encouraged to keep track of her family history.   Genetic counseling/testing may be indicated in the

## 2023-11-13 NOTE — PROGRESS NOTES
, return OB at 38w0d weeks    Patient Active Problem List   Diagnosis    Family history of BRCA gene mutation    H/O LEEP    Encounter for nonprocreative genetic counseling    Marginal insertion of umbilical cord affecting management of mother in second trimester    Family history of breast cancer    BMI 31.0-31.9,adult    Excessive fetal growth affecting management of mother in third trimester, antepartum    Urine protein increased    Abnormal placenta function test    Pyelectasis of fetus on prenatal ultrasound    Low ferritin    Low vitamin B12 level    Fetal abnormality affecting management of mother    Positive GBS test        Subjective:  doing well    Bleeding no   Leaking of fluid no   Painful cramping/contractions no   Headache no   Epigastric pain no   Edema no     Fetal movement good, patient reports 10 movements in 2 hours    Objective:  See flowsheet  Vitals:    23 1044   BP: 121/78   Pulse: 72         Assessment:   at 38w0d   Blood pressure WNL  Size equals dates  Total weight gain appropriate  Not eligible  GBS positive    ICD-10-CM    1. H/O LEEP  Z98.890       2. Pregnancy with 38 completed weeks gestation  Z3A.38            Plan:       RTO 1 weeks  Continue seeing MFM for NSTs     Fetal movement:  Baby should move 10 times every 2 hours. If movements decrease below 10 in 2 hours, or decrease from what is typical for that pregnancy, patient should eat something, drink something, and lay down to count movements. If she does not feel 10 movements after doing these things, she is to immediately proceed to L&D for NST. She should NOT WAIT until the next day.

## 2023-11-13 NOTE — PATIENT INSTRUCTIONS
Please arrive for your scheduled appointment at least 15 minutes early with your actual insurance card+ a photo ID. Also if you need any refills ordered or have questions, it may take up 48 hours to reply. Please allow ample time for your refills. Call me when you use last refill. Thank you for your cooperation. You might be having an NST at your next appt. Please eat a large snack or breakfast before coming to office. Thank you Call your primary obstetrician with bleeding, leaking of fluid, abdominal tenderness, headache, blurry vision, epigastric pain and increased urinary frequency. If you are experiencing an emergency and need immediate help, call 911 or go to go emergency room or labor and delivery. Pt states good fetal movement Do kick counts after dinner. Call your primary obstetrician if less than 10 kicks in 2 hours after dinner. Call your primary obstetrician with bleeding, leaking of fluid, abdominal tenderness, headache, blurry vision, epigastric pain and increased urinary frequency. if you are sick, not feeling well or have an infectious process going on please reschedule your appointment by calling [phone]. Also if any family members are not feeling well, please do not bring them to your appointment. We appreciate your cooperation. We are doing this in order to protect our pregnant mothers+ their babies. if you are sick, not feeling well or have an infectious process going on please reschedule your appointment by calling [phone]. Also if any family members are not feeling well, please do not bring them to your appointment. We appreciate your cooperation. We are doing this in order to protect our pregnant mothers+ their babies.

## 2023-11-20 ENCOUNTER — ANESTHESIA (OUTPATIENT)
Dept: LABOR AND DELIVERY | Age: 33
End: 2023-11-20
Payer: COMMERCIAL

## 2023-11-20 ENCOUNTER — ANESTHESIA EVENT (OUTPATIENT)
Dept: LABOR AND DELIVERY | Age: 33
End: 2023-11-20
Payer: COMMERCIAL

## 2023-11-20 ENCOUNTER — HOSPITAL ENCOUNTER (INPATIENT)
Age: 33
LOS: 2 days | Discharge: HOME | End: 2023-11-22
Attending: OBSTETRICS & GYNECOLOGY | Admitting: OBSTETRICS & GYNECOLOGY
Payer: COMMERCIAL

## 2023-11-20 ENCOUNTER — ANCILLARY PROCEDURE (OUTPATIENT)
Dept: OBGYN CLINIC | Age: 33
End: 2023-11-20
Payer: COMMERCIAL

## 2023-11-20 ENCOUNTER — ROUTINE PRENATAL (OUTPATIENT)
Dept: OBGYN CLINIC | Age: 33
End: 2023-11-20
Payer: COMMERCIAL

## 2023-11-20 VITALS
HEART RATE: 84 BPM | WEIGHT: 192.25 LBS | SYSTOLIC BLOOD PRESSURE: 116 MMHG | BODY MASS INDEX: 33 KG/M2 | DIASTOLIC BLOOD PRESSURE: 79 MMHG

## 2023-11-20 DIAGNOSIS — O36.8130 DECREASED FETAL MOVEMENTS IN THIRD TRIMESTER, SINGLE OR UNSPECIFIED FETUS: ICD-10-CM

## 2023-11-20 DIAGNOSIS — Z98.890 H/O LEEP: Primary | ICD-10-CM

## 2023-11-20 DIAGNOSIS — O35.9XX0 FETAL ABNORMALITY AFFECTING MANAGEMENT OF MOTHER, SINGLE OR UNSPECIFIED FETUS: ICD-10-CM

## 2023-11-20 DIAGNOSIS — Z3A.39 39 WEEKS GESTATION OF PREGNANCY: ICD-10-CM

## 2023-11-20 LAB
ABO + RH BLD: NORMAL
AMPHET UR QL SCN: NEGATIVE
ARM BAND NUMBER: NORMAL
BARBITURATES UR QL SCN: NEGATIVE
BASOPHILS # BLD: 0.02 K/UL (ref 0–0.2)
BASOPHILS NFR BLD: 0 % (ref 0–2)
BENZODIAZ UR QL: NEGATIVE
BLOOD BANK SAMPLE EXPIRATION: NORMAL
BLOOD GROUP ANTIBODIES SERPL: NEGATIVE
BUPRENORPHINE UR QL: NEGATIVE
CANNABINOIDS UR QL SCN: NEGATIVE
COCAINE UR QL SCN: NEGATIVE
EOSINOPHIL # BLD: 0.05 K/UL (ref 0.05–0.5)
EOSINOPHILS RELATIVE PERCENT: 1 % (ref 0–6)
ERYTHROCYTE [DISTWIDTH] IN BLOOD BY AUTOMATED COUNT: 13.2 % (ref 11.5–15)
FENTANYL UR QL: NEGATIVE
GLUCOSE URINE, POC: NEGATIVE
HCT VFR BLD AUTO: 38.7 % (ref 34–48)
HGB BLD-MCNC: 13 G/DL (ref 11.5–15.5)
IMM GRANULOCYTES # BLD AUTO: 0.09 K/UL (ref 0–0.58)
IMM GRANULOCYTES NFR BLD: 1 % (ref 0–5)
LYMPHOCYTES NFR BLD: 1.93 K/UL (ref 1.5–4)
LYMPHOCYTES RELATIVE PERCENT: 18 % (ref 20–42)
MCH RBC QN AUTO: 30 PG (ref 26–35)
MCHC RBC AUTO-ENTMCNC: 33.6 G/DL (ref 32–34.5)
MCV RBC AUTO: 89.4 FL (ref 80–99.9)
METHADONE UR QL: NEGATIVE
MONOCYTES NFR BLD: 0.71 K/UL (ref 0.1–0.95)
MONOCYTES NFR BLD: 7 % (ref 2–12)
NEUTROPHILS NFR BLD: 74 % (ref 43–80)
NEUTS SEG NFR BLD: 8.08 K/UL (ref 1.8–7.3)
OPIATES UR QL SCN: NEGATIVE
OXYCODONE UR QL SCN: NEGATIVE
PCP UR QL SCN: NEGATIVE
PLATELET # BLD AUTO: 212 K/UL (ref 130–450)
PMV BLD AUTO: 11.6 FL (ref 7–12)
PROTEIN UA: NEGATIVE
RBC # BLD AUTO: 4.33 M/UL (ref 3.5–5.5)
TEST INFORMATION: NORMAL
WBC OTHER # BLD: 10.9 K/UL (ref 4.5–11.5)

## 2023-11-20 PROCEDURE — G8427 DOCREV CUR MEDS BY ELIG CLIN: HCPCS | Performed by: OBSTETRICS & GYNECOLOGY

## 2023-11-20 PROCEDURE — G8484 FLU IMMUNIZE NO ADMIN: HCPCS | Performed by: OBSTETRICS & GYNECOLOGY

## 2023-11-20 PROCEDURE — 99214 OFFICE O/P EST MOD 30 MIN: CPT | Performed by: OBSTETRICS & GYNECOLOGY

## 2023-11-20 PROCEDURE — 99213 OFFICE O/P EST LOW 20 MIN: CPT | Performed by: OBSTETRICS & GYNECOLOGY

## 2023-11-20 PROCEDURE — 2500000003 HC RX 250 WO HCPCS: Performed by: ANESTHESIOLOGY

## 2023-11-20 PROCEDURE — 76818 FETAL BIOPHYS PROFILE W/NST: CPT | Performed by: OBSTETRICS & GYNECOLOGY

## 2023-11-20 PROCEDURE — 51701 INSERT BLADDER CATHETER: CPT

## 2023-11-20 PROCEDURE — 80307 DRUG TEST PRSMV CHEM ANLYZR: CPT

## 2023-11-20 PROCEDURE — 76820 UMBILICAL ARTERY ECHO: CPT | Performed by: OBSTETRICS & GYNECOLOGY

## 2023-11-20 PROCEDURE — 6360000002 HC RX W HCPCS: Performed by: OBSTETRICS & GYNECOLOGY

## 2023-11-20 PROCEDURE — 76821 MIDDLE CEREBRAL ARTERY ECHO: CPT | Performed by: OBSTETRICS & GYNECOLOGY

## 2023-11-20 PROCEDURE — 86901 BLOOD TYPING SEROLOGIC RH(D): CPT

## 2023-11-20 PROCEDURE — 81002 URINALYSIS NONAUTO W/O SCOPE: CPT | Performed by: OBSTETRICS & GYNECOLOGY

## 2023-11-20 PROCEDURE — 3700000025 EPIDURAL BLOCK: Performed by: ANESTHESIOLOGY

## 2023-11-20 PROCEDURE — NBSRV NON-BILLABLE SERVICE: Performed by: OBSTETRICS & GYNECOLOGY

## 2023-11-20 PROCEDURE — 1220000001 HC SEMI PRIVATE L&D R&B

## 2023-11-20 PROCEDURE — 76815 OB US LIMITED FETUS(S): CPT | Performed by: OBSTETRICS & GYNECOLOGY

## 2023-11-20 PROCEDURE — 86850 RBC ANTIBODY SCREEN: CPT

## 2023-11-20 PROCEDURE — 86900 BLOOD TYPING SEROLOGIC ABO: CPT

## 2023-11-20 PROCEDURE — 2580000003 HC RX 258: Performed by: OBSTETRICS & GYNECOLOGY

## 2023-11-20 PROCEDURE — G8417 CALC BMI ABV UP PARAM F/U: HCPCS | Performed by: OBSTETRICS & GYNECOLOGY

## 2023-11-20 PROCEDURE — 1036F TOBACCO NON-USER: CPT | Performed by: OBSTETRICS & GYNECOLOGY

## 2023-11-20 PROCEDURE — 85025 COMPLETE CBC W/AUTO DIFF WBC: CPT

## 2023-11-20 RX ORDER — CARBOPROST TROMETHAMINE 250 UG/ML
250 INJECTION, SOLUTION INTRAMUSCULAR PRN
Status: DISCONTINUED | OUTPATIENT
Start: 2023-11-20 | End: 2023-11-22 | Stop reason: HOSPADM

## 2023-11-20 RX ORDER — DOCUSATE SODIUM 100 MG/1
100 CAPSULE, LIQUID FILLED ORAL 2 TIMES DAILY
Status: DISCONTINUED | OUTPATIENT
Start: 2023-11-20 | End: 2023-11-21 | Stop reason: SDUPTHER

## 2023-11-20 RX ORDER — MEPERIDINE HYDROCHLORIDE 25 MG/ML
25 INJECTION INTRAMUSCULAR; INTRAVENOUS; SUBCUTANEOUS EVERY 4 HOURS PRN
Status: DISCONTINUED | OUTPATIENT
Start: 2023-11-20 | End: 2023-11-22 | Stop reason: HOSPADM

## 2023-11-20 RX ORDER — MISOPROSTOL 200 UG/1
800 TABLET ORAL PRN
Status: DISCONTINUED | OUTPATIENT
Start: 2023-11-20 | End: 2023-11-22 | Stop reason: HOSPADM

## 2023-11-20 RX ORDER — SODIUM CHLORIDE, SODIUM LACTATE, POTASSIUM CHLORIDE, AND CALCIUM CHLORIDE .6; .31; .03; .02 G/100ML; G/100ML; G/100ML; G/100ML
1000 INJECTION, SOLUTION INTRAVENOUS PRN
Status: DISCONTINUED | OUTPATIENT
Start: 2023-11-20 | End: 2023-11-22 | Stop reason: HOSPADM

## 2023-11-20 RX ORDER — SODIUM CHLORIDE, SODIUM LACTATE, POTASSIUM CHLORIDE, CALCIUM CHLORIDE 600; 310; 30; 20 MG/100ML; MG/100ML; MG/100ML; MG/100ML
INJECTION, SOLUTION INTRAVENOUS CONTINUOUS
Status: DISCONTINUED | OUTPATIENT
Start: 2023-11-20 | End: 2023-11-22 | Stop reason: HOSPADM

## 2023-11-20 RX ORDER — PENICILLIN G 3000000 [IU]/50ML
3 INJECTION, SOLUTION INTRAVENOUS EVERY 4 HOURS
Status: DISCONTINUED | OUTPATIENT
Start: 2023-11-20 | End: 2023-11-22 | Stop reason: HOSPADM

## 2023-11-20 RX ORDER — PENICILLIN G POTASSIUM 5000000 [IU]/1
INJECTION, POWDER, FOR SOLUTION INTRAMUSCULAR; INTRAVENOUS
Status: DISPENSED
Start: 2023-11-20 | End: 2023-11-21

## 2023-11-20 RX ORDER — ONDANSETRON 2 MG/ML
4 INJECTION INTRAMUSCULAR; INTRAVENOUS EVERY 6 HOURS PRN
Status: DISCONTINUED | OUTPATIENT
Start: 2023-11-20 | End: 2023-11-22 | Stop reason: HOSPADM

## 2023-11-20 RX ORDER — ACETAMINOPHEN 650 MG
TABLET, EXTENDED RELEASE ORAL
Status: COMPLETED
Start: 2023-11-20 | End: 2023-11-21

## 2023-11-20 RX ORDER — SODIUM CHLORIDE, SODIUM LACTATE, POTASSIUM CHLORIDE, AND CALCIUM CHLORIDE .6; .31; .03; .02 G/100ML; G/100ML; G/100ML; G/100ML
500 INJECTION, SOLUTION INTRAVENOUS PRN
Status: DISCONTINUED | OUTPATIENT
Start: 2023-11-20 | End: 2023-11-22 | Stop reason: HOSPADM

## 2023-11-20 RX ORDER — LIDOCAINE HYDROCHLORIDE 10 MG/ML
INJECTION, SOLUTION INFILTRATION; PERINEURAL
Status: DISPENSED
Start: 2023-11-20 | End: 2023-11-21

## 2023-11-20 RX ORDER — METHYLERGONOVINE MALEATE 0.2 MG/ML
200 INJECTION INTRAVENOUS PRN
Status: DISCONTINUED | OUTPATIENT
Start: 2023-11-20 | End: 2023-11-22 | Stop reason: HOSPADM

## 2023-11-20 RX ORDER — NALOXONE HYDROCHLORIDE 0.4 MG/ML
INJECTION, SOLUTION INTRAMUSCULAR; INTRAVENOUS; SUBCUTANEOUS PRN
Status: DISCONTINUED | OUTPATIENT
Start: 2023-11-20 | End: 2023-11-21 | Stop reason: HOSPADM

## 2023-11-20 RX ADMIN — PENICILLIN G 3 MILLION UNITS: 3000000 INJECTION, SOLUTION INTRAVENOUS at 18:15

## 2023-11-20 RX ADMIN — PENICILLIN G 3 MILLION UNITS: 3000000 INJECTION, SOLUTION INTRAVENOUS at 22:20

## 2023-11-20 RX ADMIN — Medication 15 ML/HR: at 19:34

## 2023-11-20 RX ADMIN — DEXTROSE MONOHYDRATE 5 MILLION UNITS: 50 INJECTION, SOLUTION INTRAVENOUS at 14:19

## 2023-11-20 RX ADMIN — SODIUM CHLORIDE, POTASSIUM CHLORIDE, SODIUM LACTATE AND CALCIUM CHLORIDE: 600; 310; 30; 20 INJECTION, SOLUTION INTRAVENOUS at 13:45

## 2023-11-20 RX ADMIN — Medication 1 MILLI-UNITS/MIN: at 14:59

## 2023-11-20 ASSESSMENT — LIFESTYLE VARIABLES: SMOKING_STATUS: 0

## 2023-11-20 NOTE — PATIENT INSTRUCTIONS
Please arrive for your scheduled appointment at least 15 minutes early with your actual insurance card+ a photo ID. Also if you need any refills ordered or have questions, it may take up 48 hours to reply. Please allow ample time for your refills. Call me when you use last refill. Thank you for your cooperation. You might be having an NST at your next appt. Please eat a large snack or breakfast before coming to office. Thank you Call your primary obstetrician with bleeding, leaking of fluid, abdominal tenderness, headache, blurry vision, epigastric pain and increased urinary frequency. If you are experiencing an emergency and need immediate help, call 911 or go to go emergency room or labor and delivery. if you are sick, not feeling well or have an infectious process going on please reschedule your appointment by calling [phone]. Also if any family members are not feeling well, please do not bring them to your appointment. We appreciate your cooperation. We are doing this in order to protect our pregnant mothers+ their babies. Do kick counts after dinner. Call your primary obstetrician if less than 10 kicks in 2 hours after dinner. Call your primary obstetrician with bleeding, leaking of fluid, abdominal tenderness, headache, blurry vision, epigastric pain and increased urinary frequency. Do kick counts after dinner. Call your primary obstetrician if less than 10 kicks in 2 hours after dinner. Call your primary obstetrician with bleeding, leaking of fluid, abdominal tenderness, headache, blurry vision, epigastric pain and increased urinary frequency.

## 2023-11-20 NOTE — H&P
HISTORY OF PRESENT ILLNESS:      The patient is a 29 y.o. female at 36w0d. OB History          2    Para   1    Term   1            AB        Living   1         SAB        IAB        Ectopic        Molar        Multiple        Live Births   1            Patient presents with a chief complaint as above and is being admitted for induction    Estimated Due Date: Estimated Date of Delivery: 23    PRENATAL CARE:    Complicated by: marginal cord insertion, nonreactive NST in office today    PAST OB HISTORY  OB History          2    Para   1    Term   1            AB        Living   1         SAB        IAB        Ectopic        Molar        Multiple        Live Births   1                Past Medical History:        Diagnosis Date    Abnormal Pap smear of cervix 2015    Had LEEP procedure HPV     Past Surgical History:        Procedure Laterality Date    LEEP      TONSILLECTOMY       Allergies:  Patient has no known allergies.   Social History:    Social History     Socioeconomic History    Marital status:      Spouse name: Not on file    Number of children: Not on file    Years of education: Not on file    Highest education level: Not on file   Occupational History    Not on file   Tobacco Use    Smoking status: Never    Smokeless tobacco: Never   Substance and Sexual Activity    Alcohol use: Not Currently    Drug use: Never    Sexual activity: Yes     Partners: Male   Other Topics Concern    Not on file   Social History Narrative    Not on file     Social Determinants of Health     Financial Resource Strain: Low Risk  (2023)    Overall Financial Resource Strain (CARDIA)     Difficulty of Paying Living Expenses: Not hard at all   Food Insecurity: No Food Insecurity (2023)    Hunger Vital Sign     Worried About Running Out of Food in the Last Year: Never true     Ran Out of Food in the Last Year: Never true   Transportation Needs: No Transportation Needs

## 2023-11-20 NOTE — PROGRESS NOTES
S:  Feels good, is feeling some contractions  Plans epidural    O:  VSS, afebrile  On pitocin 4mU  FHT's 135 with mod variability and accels, no decels or variables  Contractions irregular, q 1-4 minutes  Receiving GBS prophylaxis    A:  Pitocin induction of labor  Category I tracing  GBS positive    P:  Continue pitocin  GBS prophylaxis  Dr. Jeffrey Fernández updated

## 2023-11-20 NOTE — PROGRESS NOTES
Dr. Xochitl Donaldson at bedside. Order received to start pitocin.  Pt my have demerol and epidural upon request.

## 2023-11-20 NOTE — PROGRESS NOTES
Pt ,39 weeks, sent from office for IOL for NRNST and pt is 3cm. Pt states positive FM. Pt denies CTX, VB and LOF. VSS.

## 2023-11-20 NOTE — PROGRESS NOTES
Pt here for bpp/nst  Pt denies any bleeding/lof  Pt c/o some contractions  Pt states good fetal movement  Pt has OB appt after this appt

## 2023-11-21 PROCEDURE — 1220000000 HC SEMI PRIVATE OB R&B

## 2023-11-21 PROCEDURE — 7200000001 HC VAGINAL DELIVERY

## 2023-11-21 PROCEDURE — 2580000003 HC RX 258: Performed by: MIDWIFE

## 2023-11-21 PROCEDURE — 6360000002 HC RX W HCPCS: Performed by: OBSTETRICS & GYNECOLOGY

## 2023-11-21 PROCEDURE — 0HQ9XZZ REPAIR PERINEUM SKIN, EXTERNAL APPROACH: ICD-10-PCS | Performed by: MIDWIFE

## 2023-11-21 PROCEDURE — 6370000000 HC RX 637 (ALT 250 FOR IP): Performed by: MIDWIFE

## 2023-11-21 PROCEDURE — 10907ZC DRAINAGE OF AMNIOTIC FLUID, THERAPEUTIC FROM PRODUCTS OF CONCEPTION, VIA NATURAL OR ARTIFICIAL OPENING: ICD-10-PCS | Performed by: MIDWIFE

## 2023-11-21 RX ORDER — FERROUS SULFATE 325(65) MG
325 TABLET ORAL EVERY OTHER DAY
Status: DISCONTINUED | OUTPATIENT
Start: 2023-11-21 | End: 2023-11-22 | Stop reason: HOSPADM

## 2023-11-21 RX ORDER — SODIUM CHLORIDE 9 MG/ML
INJECTION, SOLUTION INTRAVENOUS PRN
Status: DISCONTINUED | OUTPATIENT
Start: 2023-11-21 | End: 2023-11-22 | Stop reason: HOSPADM

## 2023-11-21 RX ORDER — ACETAMINOPHEN 500 MG
1000 TABLET ORAL EVERY 8 HOURS PRN
Status: DISCONTINUED | OUTPATIENT
Start: 2023-11-21 | End: 2023-11-22 | Stop reason: HOSPADM

## 2023-11-21 RX ORDER — SODIUM CHLORIDE 0.9 % (FLUSH) 0.9 %
5-40 SYRINGE (ML) INJECTION EVERY 12 HOURS SCHEDULED
Status: DISCONTINUED | OUTPATIENT
Start: 2023-11-21 | End: 2023-11-22 | Stop reason: HOSPADM

## 2023-11-21 RX ORDER — IBUPROFEN 800 MG/1
800 TABLET ORAL EVERY 8 HOURS SCHEDULED
Status: DISCONTINUED | OUTPATIENT
Start: 2023-11-21 | End: 2023-11-22 | Stop reason: HOSPADM

## 2023-11-21 RX ORDER — SODIUM CHLORIDE 0.9 % (FLUSH) 0.9 %
5-40 SYRINGE (ML) INJECTION PRN
Status: DISCONTINUED | OUTPATIENT
Start: 2023-11-21 | End: 2023-11-22 | Stop reason: HOSPADM

## 2023-11-21 RX ORDER — DOCUSATE SODIUM 100 MG/1
100 CAPSULE, LIQUID FILLED ORAL 2 TIMES DAILY
Status: DISCONTINUED | OUTPATIENT
Start: 2023-11-21 | End: 2023-11-22 | Stop reason: HOSPADM

## 2023-11-21 RX ORDER — MODIFIED LANOLIN
OINTMENT (GRAM) TOPICAL PRN
Status: DISCONTINUED | OUTPATIENT
Start: 2023-11-21 | End: 2023-11-22 | Stop reason: HOSPADM

## 2023-11-21 RX ADMIN — Medication: at 03:32

## 2023-11-21 RX ADMIN — DOCUSATE SODIUM 100 MG: 100 CAPSULE, LIQUID FILLED ORAL at 08:29

## 2023-11-21 RX ADMIN — ACETAMINOPHEN 1000 MG: 500 TABLET ORAL at 08:28

## 2023-11-21 RX ADMIN — Medication 166.7 ML: at 00:05

## 2023-11-21 RX ADMIN — Medication: at 00:00

## 2023-11-21 RX ADMIN — ACETAMINOPHEN 1000 MG: 500 TABLET ORAL at 18:06

## 2023-11-21 RX ADMIN — DOCUSATE SODIUM 100 MG: 100 CAPSULE, LIQUID FILLED ORAL at 20:39

## 2023-11-21 RX ADMIN — IBUPROFEN 800 MG: 800 TABLET, FILM COATED ORAL at 05:53

## 2023-11-21 RX ADMIN — IBUPROFEN 800 MG: 800 TABLET, FILM COATED ORAL at 14:05

## 2023-11-21 RX ADMIN — IBUPROFEN 800 MG: 800 TABLET, FILM COATED ORAL at 22:03

## 2023-11-21 RX ADMIN — SODIUM CHLORIDE, PRESERVATIVE FREE 10 ML: 5 INJECTION INTRAVENOUS at 08:29

## 2023-11-21 ASSESSMENT — PAIN - FUNCTIONAL ASSESSMENT
PAIN_FUNCTIONAL_ASSESSMENT: ACTIVITIES ARE NOT PREVENTED

## 2023-11-21 ASSESSMENT — PAIN SCALES - GENERAL
PAINLEVEL_OUTOF10: 4
PAINLEVEL_OUTOF10: 4
PAINLEVEL_OUTOF10: 5
PAINLEVEL_OUTOF10: 3

## 2023-11-21 ASSESSMENT — PAIN DESCRIPTION - DESCRIPTORS
DESCRIPTORS: ACHING;DISCOMFORT;SORE
DESCRIPTORS: ACHING;DISCOMFORT;SORE
DESCRIPTORS: ACHING;CRAMPING;DISCOMFORT

## 2023-11-21 ASSESSMENT — PAIN DESCRIPTION - ORIENTATION
ORIENTATION: LOWER

## 2023-11-21 ASSESSMENT — PAIN DESCRIPTION - LOCATION
LOCATION: ABDOMEN

## 2023-11-21 NOTE — PROGRESS NOTES
Tracing reviewed, I was attending two vaginal births since placement of internal monitors  FHT's 130 with mod variability, + accelerations, + variables, occasional late decelerations:  Cat II tracing  Patient positioned on right side, fluid bolus initiated

## 2023-11-21 NOTE — PROGRESS NOTES
Patient admitted into room 310 , oriented to surroundings. Fundus firm @ U/-1, small amount of bleeding, no clots. Admission Packet/education reviewed with Pt and significant other. Infant safety/education completed. Pt had the T-DAP and flu vaccine during pregnancy. Discharge education sheet explained and PPD questionnaire given to pt. Instructed to call for assistance.

## 2023-11-21 NOTE — ANESTHESIA PROCEDURE NOTES
Epidural Block    Patient location during procedure: OB  Start time: 11/20/2023 7:25 PM  End time: 11/20/2023 7:40 PM  Reason for block: labor epidural  Staffing  Performed: other anesthesia staff   Anesthesiologist: Ana Alas MD  Resident/CRNA: ANGELIA Alfonso CRNA  Other anesthesia staff: Rojelio Fitzgerald RN  Performed by: ANGELIA Alfonso CRNA  Authorized by: Ana Alas MD    Epidural  Patient position: sitting  Prep: Betadine  Patient monitoring: cardiac monitor, continuous pulse ox and frequent blood pressure checks  Approach: midline  Location: L3-4  Injection technique: SAWYER air  Provider prep: mask, sterile gloves and sterile gown  Needle  Needle type: Tuohy   Needle gauge: 18 G  Needle length: 3.5 in  Needle insertion depth: 5 cm  Catheter type: side hole  Catheter size: 20 G.   Catheter at skin depth: 11 cm  Test dose: negativeCatheter Secured: tegaderm and tape  Assessment  Hemodynamics: stable  Attempts: 1  Outcomes: uncomplicated and patient tolerated procedure well  Preanesthetic Checklist  Completed: patient identified, IV checked, site marked, risks and benefits discussed, surgical/procedural consents, equipment checked, pre-op evaluation, timeout performed, anesthesia consent given, oxygen available and monitors applied/VS acknowledged

## 2023-11-21 NOTE — LACTATION NOTE
Second time mom breast fed her first baby for 10 months. Mom stated so far this baby is breastfeeding well. Discussed frequency and duration of breastfeeds and signs of adequate milk transfer. Encouraged mom to hand express drops of colostrum at the start of a  breastfeed. Recommended to avoid a pacifier for three weeks or until breastfeeding is well established. Mom is requesting an electric breast pump for home. Went over breastfeeding resources. Encouraged mom to call us with questions or concerns or for assistance.

## 2023-11-21 NOTE — L&D DELIVERY NOTE
liveborn male at 1201am, vigorous at delivery, dried and stimulated on mother's abdomen. Cord clamped and cut at 60 seconds, baby taken to warmer for further stimulation. Cord blood and cord gasses obtained and sent. Placenta spontaneous, complete, 3V cord. Fundus massaged to firm, no active bleeding, EBL ~400cc, pitocin in 4th stage. Dr. Tamiko Collins notified of delivery      [place], [place] Pending [facility] [[id number]]      Labor Events      Cervical Ripening Date/Time:        Rupture Date/Time:  23 21:20:00   Rupture Type: AROM  Fluid Color: Clear  Fluid Odor: None              Delivery Details                  Cord                  Placenta           Lacerations           Blood Loss  Mother: Sophia Bhakta #[id number]     Start of Mother's Information      Delivery Blood Loss  23 1217 - 23 0017      None                 End of Mother's Information  Mother: Sophia Bhakta #[id number]                Delivery Providers    Delivering clinician:              Shelby Assessment          Skin Color:   Heart Rate:   Reflex Irritability:   Muscle Tone:   Respiratory Effort:    Total:            1 Minute:         5 Minute:                                                  Measurements

## 2023-11-22 VITALS
RESPIRATION RATE: 18 BRPM | TEMPERATURE: 98.6 F | DIASTOLIC BLOOD PRESSURE: 64 MMHG | OXYGEN SATURATION: 96 % | SYSTOLIC BLOOD PRESSURE: 110 MMHG | HEART RATE: 73 BPM

## 2023-11-22 LAB
HCT VFR BLD AUTO: 36.6 % (ref 34–48)
HGB BLD-MCNC: 11.9 G/DL (ref 11.5–15.5)

## 2023-11-22 PROCEDURE — 6370000000 HC RX 637 (ALT 250 FOR IP): Performed by: MIDWIFE

## 2023-11-22 PROCEDURE — 36415 COLL VENOUS BLD VENIPUNCTURE: CPT

## 2023-11-22 PROCEDURE — 85014 HEMATOCRIT: CPT

## 2023-11-22 PROCEDURE — 85018 HEMOGLOBIN: CPT

## 2023-11-22 PROCEDURE — APPNB30 APP NON BILLABLE TIME 0-30 MINS: Performed by: PHYSICIAN ASSISTANT

## 2023-11-22 RX ORDER — IBUPROFEN 600 MG/1
600 TABLET ORAL EVERY 6 HOURS PRN
Qty: 30 TABLET | Refills: 0 | Status: SHIPPED | OUTPATIENT
Start: 2023-11-22

## 2023-11-22 RX ADMIN — DOCUSATE SODIUM 100 MG: 100 CAPSULE, LIQUID FILLED ORAL at 10:22

## 2023-11-22 RX ADMIN — IBUPROFEN 800 MG: 800 TABLET, FILM COATED ORAL at 06:42

## 2023-11-22 RX ADMIN — ACETAMINOPHEN 1000 MG: 500 TABLET ORAL at 03:01

## 2023-11-22 ASSESSMENT — PAIN SCALES - GENERAL: PAINLEVEL_OUTOF10: 4

## 2023-11-22 NOTE — CARE COORDINATION
SW Discharge Planning   SW received consult for \" PPD 11\"    MACY met with Jennifer Cortez and provided her with education, information and resources on post partum depression.  Jennifer Cortez was pleasant and receptive to information       Electronically signed by HARLEY Bah on 11/22/2023 at 10:35 AM

## 2023-11-22 NOTE — DISCHARGE SUMMARY
Obstetrical Discharge Form    Patient Nam: Laura Crump    YOB: 1990    Medical Record Number: [id number]    Primary OB Clinician: Saeid Slaughter CNM    Reason for Hospitalization, Chief Complaint, Diagnosis, etc: 29year old female  2 para 1 at 43 weeks' 1 days' gestation admitted for IOL secondary to nonreactive NST at office and cervix 3 cms dilated. Pregnancy complicated by marginal umbilical cord insertion into the placenta, history of LEEP, left fetal pyelectasis. Gestational Age at time of delivery:  39 weeks 1 days    Date of Admission: 2023    Date of Delivery: 2023    Anesthesia: Epidural    Delivery Type: spontaneous vaginal delivery    Baby: Liveborn male, weighing 3969 grams with Apgar scores of 8 & 9, delivered at 00:01. Intrapartum complications: none    Laceration: 1st degree perineal, repaired    Episiotomy: none    Feeding method: breast    Discharge Date: 2023 postpartum day # 1    Condition: both Mother and infant discharged home in stable condition  Plan:     Patient advised to call and schedule her follow-up appointment for 6 weeks following her delivery, sooner if needed. Precautions given: call for: fever greater than 101 F, pain, heavy bleeding, vomiting, shortness of breath, breast redness or pain, calf pain, or any concerns. No heavy lifting. No sexual intercourse for 6 weeks. Please refer to chart for further details. Script: Motrin. Resume PNV with iron.         Sheridan Goltz, PA-C

## 2023-11-22 NOTE — ANESTHESIA POSTPROCEDURE EVALUATION
Department of Anesthesiology  Postprocedure Note    Patient: Ritu Aggarwal  MRN: [id number]  [facility] 1990  Date of evaluation: 11/21/2023      Procedure Summary     Date: 11/20/23 Room / Location:     Anesthesia Start: 1925 Anesthesia Stop: 11/21/23 0001    Procedure: Labor Analgesia Diagnosis:     Scheduled Providers:  Responsible Provider: Nick Chance MD    Anesthesia Type: spinal, epidural ASA Status: 2          Anesthesia Type: No value filed.     Ascencion Phase I:      Ascencion Phase II:        Anesthesia Post Evaluation    Patient location during evaluation: PACU  Patient participation: complete - patient participated  Level of consciousness: awake  Airway patency: patent  Nausea & Vomiting: no nausea and no vomiting  Complications: no  Cardiovascular status: hemodynamically stable  Respiratory status: acceptable  Hydration status: euvolemic  Pain management: adequate

## 2023-11-22 NOTE — DISCHARGE INSTRUCTIONS
not affect the baby, go ahead and eat it. Avoid caffeine at bedtime. (chocolate has a lot of caffeine) if the baby is sensitive to it. For non-breastfeeding moms:  You may apply ice packs to your breasts over your bra for twenty minutes at a time for comfort. Avoid stimulation to your breasts, when showering allow the water to strike your back not your breasts. Wear a good fitting bra until your milk dries, such as a sports bra. If your breasts are very sore, buy a cabbage and wet some of the inner leaves and place in your bra over your breasts. Your breasts may feel warm when your milk comes in. This is normal.    INCISIONAL CARE / BRIANNA CARE  Clean your incision in the shower with mild soap. After shower pat the incision area dry and leave open to air. If used, Steri-stipes should fall off in shower by 2 weeks. If used, Jefry Pastel should be removed by the OB in office by 1 week. If used/ordered, an abdominal binder may provide support for your incision. Use the brianna-bottle after toileting until bleeding stops. It promotes healing and prevents infection. You are prone to urinary tract infections after a delivery ( c section or vaginal delivery). Drink a lot of fluids. Take a shower instead of a tub bath until bleeding stops. Cleanse your perineum from front to back  If used, stitches or internal clips will dissolve in 4-6 weeks. You may use a sitz bath or soak in a clean tub as needed for comfort. Kegel exercises will help restore bladder control. You may use cool compress on incision site. SWELLING   It takes about 2 weeks to get rid of all of the extra fluid you have from having a baby. Your feet and hands may swell up more than they are now. Keep your legs elevated when sitting or lying. When wearing stocking or socks, make sure they are not too tight. WHEN TO CALL THE DOCTOR  If you have a temp of 100.4 or more. Your abdomen is tender to touch.   You are passing blood clots

## 2023-11-22 NOTE — PLAN OF CARE
Problem: Pain  Goal: Verbalizes/displays adequate comfort level or baseline comfort level  Outcome: Progressing  Flowsheets (Taken 2023 7096)  Verbalizes/displays adequate comfort level or baseline comfort level: Assess pain using appropriate pain scale     Problem: Postpartum  Goal: Experiences normal postpartum course  Description:  Postpartum OB-Pregnancy care plan goal which identifies if the mother is experiencing a normal postpartum course  Outcome: Progressing  Goal: Appropriate maternal -  bonding  Description:  Postpartum OB-Pregnancy care plan goal which identifies if the mother and  are bonding appropriately  Outcome: Progressing  Goal: Establishment of infant feeding pattern  Description:  Postpartum OB-Pregnancy care plan goal which identifies if the mother is establishing a feeding pattern with their   Outcome: Progressing     Problem: Safety - Adult  Goal: Free from fall injury  Outcome: Progressing

## 2023-11-22 NOTE — PROGRESS NOTES
[employer] [place] [employer] screening results were discussed with parent. Questions answered. Brochure given to parent. Advised to monitor developmental milestones and contact physician for any concerns.    Peng Lee, AuD

## 2023-11-22 NOTE — PLAN OF CARE
Problem: Pain  Goal: Verbalizes/displays adequate comfort level or baseline comfort level  2023 1149 by Keagan Garcia RN  Outcome: Completed  2023 014 by Irene Smith RN  Outcome: Progressing  Flowsheets (Taken 2023 2302)  Verbalizes/displays adequate comfort level or baseline comfort level: Assess pain using appropriate pain scale     Problem: Postpartum  Goal: Experiences normal postpartum course  Description:  Postpartum OB-Pregnancy care plan goal which identifies if the mother is experiencing a normal postpartum course  2023 1149 by Keagan Garcia RN  Outcome: Completed  2023 014 by Irene Smith RN  Outcome: Progressing  Goal: Appropriate maternal -  bonding  Description:  Postpartum OB-Pregnancy care plan goal which identifies if the mother and  are bonding appropriately  2023 1149 by Keagan Garcia RN  Outcome: Completed  2023 by Irene Smith RN  Outcome: Progressing  Goal: Establishment of infant feeding pattern  Description:  Postpartum OB-Pregnancy care plan goal which identifies if the mother is establishing a feeding pattern with their   2023 1149 by Keagan Garcia RN  Outcome: Completed  2023 by Irene Smith RN  Outcome: Progressing     Problem: Safety - Adult  Goal: Free from fall injury  2023 1149 by Keagan Garcia RN  Outcome: Completed  2023 by Irene Smith RN  Outcome: Progressing

## 2023-11-22 NOTE — LACTATION NOTE
Mom reports latch is painful, concerned baby may have a lip-tie. Encouraged mom to speak with Peds for a possible ENT referral. Encouraged frequent feeds to establish milk supply. Reviewed benefits and safety of skin to skin. Inst on adequate I/O and importance of keeping track of diapers at home. Instructed on signs of dehydration such as infant refusing to feed, decreased wet diapers and infant becoming listless and notify provider if these occur. Reviewed with mom the importance of notifying the physician if baby looks more jaundiced. Lactation office # given if follow-up needed, as well as support group information. Encouraged to call with any concerns. Support and encouragement given.

## 2024-01-22 ENCOUNTER — POSTPARTUM VISIT (OUTPATIENT)
Dept: OBGYN | Age: 34
End: 2024-01-22
Payer: COMMERCIAL

## 2024-01-22 VITALS
HEART RATE: 69 BPM | WEIGHT: 165.2 LBS | SYSTOLIC BLOOD PRESSURE: 110 MMHG | BODY MASS INDEX: 28.36 KG/M2 | DIASTOLIC BLOOD PRESSURE: 77 MMHG

## 2024-01-22 PROCEDURE — 99213 OFFICE O/P EST LOW 20 MIN: CPT | Performed by: MIDWIFE

## 2024-01-22 ASSESSMENT — PATIENT HEALTH QUESTIONNAIRE - PHQ9
SUM OF ALL RESPONSES TO PHQ9 QUESTIONS 1 & 2: 0
SUM OF ALL RESPONSES TO PHQ QUESTIONS 1-9: 0
SUM OF ALL RESPONSES TO PHQ QUESTIONS 1-9: 0
1. LITTLE INTEREST OR PLEASURE IN DOING THINGS: 0
SUM OF ALL RESPONSES TO PHQ QUESTIONS 1-9: 0
SUM OF ALL RESPONSES TO PHQ QUESTIONS 1-9: 0
2. FEELING DOWN, DEPRESSED OR HOPELESS: 0

## 2024-01-22 NOTE — PROGRESS NOTES
Patient here for postpartum visit. Patient is breast feeding. Patient is vaginally bleeding. LMP 1/20/24. Patient has no concerns at this time.

## 2024-01-31 NOTE — PROGRESS NOTES
Feels well  No longer bleeding  Perineum comfortable  Breastfeeding well  Does not desire cycle control at this time    Impression:  Normal PP  Plan:  RTO 1 year and PRN

## 2025-07-21 ENCOUNTER — OFFICE VISIT (OUTPATIENT)
Age: 35
End: 2025-07-21
Payer: COMMERCIAL

## 2025-07-21 VITALS
OXYGEN SATURATION: 98 % | TEMPERATURE: 97.8 F | HEIGHT: 64 IN | SYSTOLIC BLOOD PRESSURE: 123 MMHG | HEART RATE: 66 BPM | WEIGHT: 150.6 LBS | BODY MASS INDEX: 25.71 KG/M2 | DIASTOLIC BLOOD PRESSURE: 80 MMHG

## 2025-07-21 DIAGNOSIS — N64.4 BREAST PAIN, RIGHT: Primary | ICD-10-CM

## 2025-07-21 DIAGNOSIS — N94.9 ADNEXAL FULLNESS: ICD-10-CM

## 2025-07-21 PROCEDURE — G8419 CALC BMI OUT NRM PARAM NOF/U: HCPCS | Performed by: MIDWIFE

## 2025-07-21 PROCEDURE — G8427 DOCREV CUR MEDS BY ELIG CLIN: HCPCS | Performed by: MIDWIFE

## 2025-07-21 PROCEDURE — 99213 OFFICE O/P EST LOW 20 MIN: CPT | Performed by: MIDWIFE

## 2025-07-21 PROCEDURE — 1036F TOBACCO NON-USER: CPT | Performed by: MIDWIFE

## 2025-07-21 SDOH — ECONOMIC STABILITY: FOOD INSECURITY: WITHIN THE PAST 12 MONTHS, YOU WORRIED THAT YOUR FOOD WOULD RUN OUT BEFORE YOU GOT MONEY TO BUY MORE.: NEVER TRUE

## 2025-07-21 SDOH — ECONOMIC STABILITY: FOOD INSECURITY: WITHIN THE PAST 12 MONTHS, THE FOOD YOU BOUGHT JUST DIDN'T LAST AND YOU DIDN'T HAVE MONEY TO GET MORE.: NEVER TRUE

## 2025-07-21 ASSESSMENT — PATIENT HEALTH QUESTIONNAIRE - PHQ9
SUM OF ALL RESPONSES TO PHQ QUESTIONS 1-9: 0
1. LITTLE INTEREST OR PLEASURE IN DOING THINGS: NOT AT ALL
SUM OF ALL RESPONSES TO PHQ QUESTIONS 1-9: 0
2. FEELING DOWN, DEPRESSED OR HOPELESS: NOT AT ALL

## 2025-07-21 NOTE — PROGRESS NOTES
Patient alert and pleasant with no complaints  Here today for annual GYN visit  Pt states her hormones feels off since her last pregnancy and would like to discuss.     Discharge instructions have been discussed with the patient. Patient advised to call our office with any questions or concerns.   Voiced understanding.

## 2025-07-21 NOTE — PROGRESS NOTES
Agnes Jackson    Chief Complaint   Patient presents with    Annual Exam       HPI  Here for annual  Cousin just diagnosed with colon cancer age 30.  She is going to talk with [facility] about testing    Tearful, wants to see a counselor    LMP 25  Menses:  regular, monthly, last a week, moderate flow  Gyn symptoms:  cramps with menses, \"they are better\", very emotional  Sexual activity:  sexually active with   Menopausal symptoms:  NA  Breast symptoms:  has a that feels like a  bruise, she doesn't feel a lump, no longer breastfeeding  Urinary symptoms:  no    Last PAP:  2023 NILM with negative HPV  Last mammogram:  never  Last colonoscopy:  never.  Recommendations reviewed    Domestic abuse: no     Past Medical History:   Diagnosis Date    Abnormal Pap smear of cervix     Had LEEP procedure HPV      Past Surgical History:   Procedure Laterality Date    LEEP      TONSILLECTOMY        OB History          2    Para   2    Term   2            AB        Living   2         SAB        IAB        Ectopic        Molar        Multiple   0    Live Births   2               Current Outpatient Medications   Medication Sig Dispense Refill    ibuprofen (ADVIL;MOTRIN) 600 MG tablet Take 1 tablet by mouth every 6 hours as needed for Pain (Patient not taking: Reported on 2024) 30 tablet 0    ferrous sulfate (IRON 325) 325 (65 Fe) MG tablet Take 1 tablet by mouth 2 times daily (Patient not taking: Reported on 2024) 60 tablet 2    vitamin B-12 (CYANOCOBALAMIN) 1000 MCG tablet Take 1 tablet by mouth daily (Patient not taking: Reported on 2024) 30 tablet 3    acetaminophen (TYLENOL) 325 MG tablet Take 2 tablets by mouth every 6 hours as needed (Patient not taking: Reported on 2024)      Prenatal MV & Min w/FA-DHA (ONE A DAY PRENATAL PO) Take 1 tablet by mouth daily       No current facility-administered medications for this visit.      No Known Allergies     Review of Systems

## 2025-08-18 ENCOUNTER — HOSPITAL ENCOUNTER (OUTPATIENT)
Dept: ULTRASOUND IMAGING | Age: 35
Discharge: HOME | End: 2025-08-20
Payer: COMMERCIAL

## 2025-08-18 DIAGNOSIS — N94.9 ADNEXAL FULLNESS: ICD-10-CM

## 2025-08-18 PROCEDURE — 76856 US EXAM PELVIC COMPLETE: CPT
